# Patient Record
Sex: MALE | Race: WHITE | Employment: OTHER | URBAN - METROPOLITAN AREA
[De-identification: names, ages, dates, MRNs, and addresses within clinical notes are randomized per-mention and may not be internally consistent; named-entity substitution may affect disease eponyms.]

---

## 2017-07-06 ENCOUNTER — HOSPITAL ENCOUNTER (OUTPATIENT)
Dept: PET IMAGING | Age: 67
Discharge: HOME | End: 2017-07-06
Attending: INTERNAL MEDICINE
Payer: MEDICARE

## 2017-07-06 VITALS — BODY MASS INDEX: 33.21 KG/M2 | HEIGHT: 70 IN | WEIGHT: 232 LBS

## 2017-07-06 DIAGNOSIS — R91.1 PULMONARY NODULE, RIGHT: ICD-10-CM

## 2017-07-06 PROCEDURE — 78815 PET IMAGE W/CT SKULL-THIGH: CPT

## 2017-07-06 RX ORDER — SODIUM CHLORIDE 0.9 % (FLUSH) 0.9 %
10 SYRINGE (ML) INJECTION
Status: COMPLETED | OUTPATIENT
Start: 2017-07-06 | End: 2017-07-06

## 2017-07-06 RX ADMIN — Medication 10 ML: at 07:53

## 2017-10-23 ENCOUNTER — OFFICE VISIT (OUTPATIENT)
Dept: CARDIOLOGY CLINIC | Age: 67
End: 2017-10-23

## 2017-10-23 VITALS
DIASTOLIC BLOOD PRESSURE: 60 MMHG | HEART RATE: 79 BPM | SYSTOLIC BLOOD PRESSURE: 130 MMHG | BODY MASS INDEX: 35.65 KG/M2 | HEIGHT: 70 IN | RESPIRATION RATE: 16 BRPM | OXYGEN SATURATION: 95 % | WEIGHT: 249 LBS

## 2017-10-23 DIAGNOSIS — C34.31 MALIGNANT NEOPLASM OF LOWER LOBE OF RIGHT LUNG (HCC): ICD-10-CM

## 2017-10-23 DIAGNOSIS — I25.10 CORONARY ARTERY DISEASE INVOLVING NATIVE CORONARY ARTERY OF NATIVE HEART WITHOUT ANGINA PECTORIS: ICD-10-CM

## 2017-10-23 DIAGNOSIS — I73.9 INTERMITTENT CLAUDICATION (HCC): ICD-10-CM

## 2017-10-23 DIAGNOSIS — J44.9 CHRONIC OBSTRUCTIVE PULMONARY DISEASE, UNSPECIFIED COPD TYPE (HCC): ICD-10-CM

## 2017-10-23 DIAGNOSIS — I27.81 COR PULMONALE (HCC): Primary | ICD-10-CM

## 2017-10-23 NOTE — MR AVS SNAPSHOT
Visit Information Date & Time Provider Department Dept. Phone Encounter #  
 10/23/2017  1:40 PM Nadia Robin MD [facility] Donte Mason [phone] [id number] Upcoming Health Maintenance Date Due Hepatitis C Screening 1950 DTaP/Tdap/Td series (1 - Tdap) 11/20/1969 FOBT Q 1 YEAR AGE 50-75 11/20/1998 ZOSTER VACCINE AGE 60> 9/20/2008 GLAUCOMA SCREENING Q2Y 11/20/2013 Pneumococcal 65+ Low/Medium Risk (1 of 2 - PCV13) 11/20/2013 MEDICARE YEARLY EXAM 11/20/2013 INFLUENZA AGE 9 TO ADULT 8/1/2017 Allergies as of 10/23/2017  Review Complete On: 10/23/2017 By: Claudia Sterling Severity Noted Reaction Type Reactions Other Plant, Animal, Environmental  10/12/2016    Runny Nose Tree pollen, mold, dust  
  
Current Immunizations  Never Reviewed No immunizations on file. Not reviewed this visit Vitals BP Pulse Resp Height(growth percentile) Weight(growth percentile) SpO2  
 130/60 (BP 1 Location: Left arm, BP Patient Position: Sitting) 79 16 5' 10\" (1.778 m) 249 lb (112.9 kg) 95% BMI Smoking Status 35.73 kg/m2 Former Smoker BMI and BSA Data Body Mass Index Body Surface Area 35.73 kg/m 2 2.36 m 2 Preferred Pharmacy Pharmacy Name Phone [address] [facility] [phone] Your Updated Medication List  
  
   
This list is accurate as of: 10/23/17  1:48 PM.  Always use your most recent med list.  
  
  
  
  
 aspirin, buffered 81 mg Tab Take 81 mg by mouth daily. carvedilol 3.125 mg tablet Commonly known as:  Kavon Pintos Take 1 Tab by mouth two (2) times daily (with meals). 90-day supply for ICD9 291.8 Diastolic heart failure CIALIS 5 mg tablet Generic drug:  tadalafil Take 5 mg by mouth as needed. COMBIVENT RESPIMAT  mcg/actuation inhaler Generic drug:  ipratropium-albuterol Take 1 Puff by inhalation every six (6) hours. CRESTOR 20 mg tablet Generic drug:  rosuvastatin Take 20 mg by mouth nightly. fluticasone-salmeterol 250-50 mcg/dose diskus inhaler Commonly known as:  ADVAIR DISKUS Take 1 Puff by inhalation every twelve (12) hours. 90-day supply for ICD9 493.2 Chronic obstructive asthma  
  
 furosemide 40 mg tablet Commonly known as:  LASIX Take 1 Tab by mouth daily. 90-day supply for ICD9 416.8 Other chronic pulmonary heart diseases  
  
 insulin lispro protamine/insulin lispro 100 unit/mL (75-25) injection Commonly known as:  HumaLOG Mix 75-25 Take 25 units twice daily with breakfast and dinner. Please increase as needed  Indications: TYPE 2 DIABETES MELLITUS  
  
 JARDIANCE 10 mg tablet Generic drug:  empagliflozin Take  by mouth daily. metFORMIN 1,000 mg tablet Commonly known as:  GLUCOPHAGE Take 1,000 mg by mouth two (2) times daily (with meals). Indications: TYPE 2 DIABETES MELLITUS STIOLTO RESPIMAT 2.5-2.5 mcg/actuation Mist  
Generic drug:  tiotropium-olodaterol Take  by inhalation. ZyrTEC 10 mg tablet Generic drug:  cetirizine Take 10 mg by mouth daily. Introducing [facility]! Dear Yanick Thakkar: Thank you for requesting a modulR account. Our records indicate that you already have an active modulR account. You can access your account anytime at [email or web address] Did you know that you can access your hospital and ER discharge instructions at any time in modulR? You can also review all of your test results from your hospital stay or ER visit. Additional Information If you have questions, please visit the Frequently Asked Questions section of the modulR website at [email or web address] Remember, modulR is NOT to be used for urgent needs. For medical emergencies, dial 911. Now available from your iPhone and Android! Please provide this summary of care documentation to your next provider. Your primary care clinician is listed as Bin Tellez. If you have any questions after today's visit, please call [phone].

## 2017-10-23 NOTE — PROGRESS NOTES
HISTORY OF PRESENT ILLNESS  Heidi Guerrero is a 74 y.o. male. He has COPD and some right heart dysfunction. He has continued to smoke cigarettes. He also has diabetes. However, eight weeks ago, he was found to have a cancer in his right lower lobe and underwent resection with minimally invasive surgery at [facility] [place]. He has recovered quite quickly. He is now walking two miles per day. He hopes to go up to four miles per day. He has stopped smoking. HPI  Patient Active Problem List   Diagnosis Code    [facility]chronic obstructive pulmonary disease) ([facility] J44.9    Right-sided heart failure syndrome I50.810     Current Outpatient Prescriptions   Medication Sig Dispense Refill    ipratropium-albuterol (COMBIVENT RESPIMAT)  mcg/actuation inhaler Take 1 Puff by inhalation every six (6) hours.  tiotropium-olodaterol (STIOLTO RESPIMAT) 2.5-2.5 mcg/actuation mist Take  by inhalation.  carvedilol (COREG) 3.125 mg tablet Take 1 Tab by mouth two (2) times daily (with meals). 90-day supply for ICD9 465.2 Diastolic heart failure 852 Tab 3    furosemide (LASIX) 40 mg tablet Take 1 Tab by mouth daily. 90-day supply for ICD9 416.8 Other chronic pulmonary heart diseases 90 Tab 3    empagliflozin (JARDIANCE) 10 mg tablet Take  by mouth daily.  insulin lispro protamine/insulin lispro (HUMALOG MIX 75-25) 100 unit/mL (75-25) injection Take 25 units twice daily with breakfast and dinner. Please increase as needed  Indications: TYPE 2 DIABETES MELLITUS (Patient taking differently: 50 Units by SubCUTAneous route two (2) times a day. Take 50 units twice daily  Indications: TYPE 2 DIABETES MELLITUS) 1 Vial 0    Aspirin, Buffered 81 mg tab Take 81 mg by mouth daily.  tadalafil (CIALIS) 5 mg tablet Take 5 mg by mouth as needed.  cetirizine (ZYRTEC) 10 mg tablet Take 10 mg by mouth daily.       metFORMIN (GLUCOPHAGE) 1,000 mg tablet Take 1,000 mg by mouth two (2) times daily (with meals). Indications: TYPE 2 DIABETES MELLITUS      fluticasone-salmeterol (ADVAIR DISKUS) 250-50 mcg/dose diskus inhaler Take 1 Puff by inhalation every twelve (12) hours. 90-day supply for ICD9 493.2 Chronic obstructive asthma 3 Inhaler 3    rosuvastatin (CRESTOR) 20 mg tablet Take 20 mg by mouth nightly. Past Medical History:   Diagnosis Date    Arthritis     CAD (coronary artery disease)     Congestive heart failure (HCC)     Diabetes ([facility]     High cholesterol      Past Surgical History:   Procedure Laterality Date    HX HERNIA REPAIR      Abd       Review of Systems   Respiratory: Positive for shortness of breath. All other systems reviewed and are negative. Visit Vitals    /60 (BP 1 Location: Left arm, BP Patient Position: Sitting)    Pulse 79    Resp 16    Ht 5' 10\" (1.778 m)    Wt 249 lb (112.9 kg)    SpO2 95%    BMI 35.73 kg/m2       Physical Exam   Constitutional: He is oriented to person, place, and time. He appears well-nourished. HENT:   Head: Atraumatic. Eyes: Conjunctivae are normal.   Neck: Neck supple. Cardiovascular: Normal rate, regular rhythm and normal heart sounds. Exam reveals no gallop and no friction rub. No murmur heard. Pulmonary/Chest: He has no wheezes. He has no rales. Abdominal: Bowel sounds are normal.   Musculoskeletal: He exhibits no edema. Neurological: He is oriented to person, place, and time. Skin: Skin is dry. Nursing note and vitals reviewed. ASSESSMENT and PLAN  He has recovered quite quickly from the lung surgery. I congratulated him on stopping smoking. His blood pressure is still normal, but it is slightly higher and this should improve with his exercise. He plans to increase his walking to four miles per day. I will see him back in  one year.

## 2017-11-01 ENCOUNTER — DOCUMENTATION ONLY (OUTPATIENT)
Dept: ONCOLOGY | Age: 67
End: 2017-11-01

## 2017-11-01 NOTE — PROGRESS NOTES
This pt was referred to us for lung cancer? No records   ?  Path / records from [facility] when he needs appt and for what  Need records

## 2017-11-13 RX ORDER — FUROSEMIDE 40 MG/1
40 TABLET ORAL DAILY
Qty: 90 TAB | Refills: 3 | Status: SHIPPED | OUTPATIENT
Start: 2017-11-13

## 2017-12-15 ENCOUNTER — OFFICE VISIT (OUTPATIENT)
Dept: ENDOCRINOLOGY | Age: 67
End: 2017-12-15

## 2017-12-15 VITALS
DIASTOLIC BLOOD PRESSURE: 66 MMHG | HEIGHT: 70 IN | WEIGHT: 251 LBS | SYSTOLIC BLOOD PRESSURE: 125 MMHG | BODY MASS INDEX: 35.93 KG/M2 | HEART RATE: 66 BPM

## 2017-12-15 NOTE — PROGRESS NOTES
This 70 yo WM with Type 2 diabetes comes for diabetes care and self-management training. Diagnosed approximately 20 years ago. Strong family history. Currently treated with metformin & combo insulin. A1c elevated. Patient accompanied by . Reports decent BG control prior to surgical intervention for lung CA in August 2017. Past Medical History:   Diagnosis Date    Arthritis     CAD (coronary artery disease)     Congestive heart failure (HCC)     Diabetes (HCC)     High cholesterol        Current Outpatient Prescriptions   Medication Sig    furosemide (LASIX) 40 mg tablet Take 1 Tab by mouth daily. 90-day supply for ICD9 416.8 Other chronic pulmonary heart diseases    ipratropium-albuterol (COMBIVENT RESPIMAT)  mcg/actuation inhaler Take 1 Puff by inhalation every six (6) hours.  tiotropium-olodaterol (STIOLTO RESPIMAT) 2.5-2.5 mcg/actuation mist Take  by inhalation.  carvedilol (COREG) 3.125 mg tablet Take 1 Tab by mouth two (2) times daily (with meals). 90-day supply for ICD9 162.4 Diastolic heart failure    empagliflozin (JARDIANCE) 10 mg tablet Take 10 mg by mouth daily.  insulin lispro protamine/insulin lispro (HUMALOG MIX 75-25) 100 unit/mL (75-25) injection Take 25 units twice daily with breakfast and dinner. Please increase as needed  Indications: TYPE 2 DIABETES MELLITUS (Patient taking differently: 55 Units by SubCUTAneous route two (2) times a day. Take 50 units twice daily  Indications: type 2 diabetes mellitus)    Aspirin, Buffered 81 mg tab Take 81 mg by mouth daily.  tadalafil (CIALIS) 5 mg tablet Take 5 mg by mouth as needed.  cetirizine (ZYRTEC) 10 mg tablet Take 10 mg by mouth daily.  metFORMIN (GLUCOPHAGE) 1,000 mg tablet Take 1,000 mg by mouth two (2) times daily (with meals). Indications: TYPE 2 DIABETES MELLITUS    fluticasone-salmeterol (ADVAIR DISKUS) 250-50 mcg/dose diskus inhaler Take 1 Puff by inhalation every twelve (12) hours.  90-day supply for ICD9 493.2 Chronic obstructive asthma    rosuvastatin (CRESTOR) 20 mg tablet Take 20 mg by mouth nightly. No current facility-administered medications for this visit. Medication compliance per Valentino Santana LPN    Nursing Assessment:    Subjective:   Diabetes Self-care Practices  Healthy Eating-Consumes moderate carbohydrate meals three times on most days. Wide variation of CHO load    (B) (2) toast with coffee   (L) Homemade soup w/wo crackers; two sandwiches with chips. Drinks iced tea or water   (D) Hotdog or hamburger on bread w chips; protein with red potato and green beans   (BT) Pepperoni or cheese with crackers; corn chips  Being Active-Yard work during day. Walks 2 miles 3 times weekly now  Monitoring-Is testing fasting blood glucoses; did not bring meter   (B) 140-170  Taking Medications-Is taking prescribed diabetes medications    Objective:  Alert, oriented and in no acute distress     Visit Vitals    /66 (BP 1 Location: Right arm, BP Patient Position: Sitting)    Pulse 66    Ht 5' 10\" (1.778 m)    Wt 251 lb (113.9 kg)    BMI 36.01 kg/m2        Lab Results   Component Value Date    HBA1C 8.9 (H) 05/21/2015    CHOL 71 05/22/2015    LDLC 25.2 05/22/2015    GFRAA >60 05/24/2015    GFRNA >60 05/24/2015    TSH 11.50 (H) 05/21/2015       Diabetes Self-care Practices  Problemsolving-Concerned  Healthy Coping-Is not anxious or depressed  Reducing Risks-Annual diabetic foot exam completed today. On ASA and BP medications.  Not on statin therapy due to leg cramps  Barriers to diabetes self-care-Educational gaps related to carbohydrate    Nursing Diagnosis: Readiness for Enhanced Nutrition    Nursing Interventions:  Determined food intake, eating habits, food likes and dislikes  Identified eating behaviors to be changed  Discussed a meal plan that uses Health Plate principles  Instructed on how to read food labels    Evaluation: Patient is agreeable to CHO controlled meals    Plan:  1. Stop Jardiance per Melva Jackman MD   2. Continue metformin and combo insulin per Melva Jackman MD  3. BGM fasting, pre-dinner and bedtime for two weeks; call with results  4.  RTC one month    Harvey Santos DNP, RN, ACNS-BC, BC-ADM, CDE  Adult Health Clinical Nurse Specialist-Diabetes & Endocrine

## 2017-12-15 NOTE — PROGRESS NOTES
Chief Complaint   Patient presents with    New Patient    Diabetes     Type II diabetes. Last A1C - 8.5% - 10/24/17 . Patient is testing BS onde daily - fasting     Diabetic Foot Exam     Foot exam is due     Other     Patient states that he has had an eye exam within the past year - Dr. Stan Ayala - Next to [facility] on [address] is due        HPI  This is a 20-year-old white male with a history of type 2 diabetes mellitus ×20 years self-referred for evaluation and management. Patient has had intermittently well-controlled diabetes mellitus. He had an A1c of 8.9% in 2015. His A1c in May 2017 was 7.7%. His A1c in October of this year was 8.5%. He has been on a number of medications for his diabetes. Currently he is on Humalog 75/25 insulin 55 units twice daily, jardiance 10 mg daily, and metformin 1000 mg twice daily. His past medical history is notable for a right lower lobe lung lesion which was malignant and was resected with good [place] [facility] [place] of this [place]. He is apparently felt to be cured of the malignancy. Following the surgery, he quit smoking and is back to walking 2 miles a day. He says he checks his blood sugar in the morning only in those blood sugars range between 145 and 170. If he has breakfast it is 2 pieces of toast but often he just goes for a walk and/or does yard work. Lunch can be a soup with lots of vegetables including potatoes or can be 2 sandwiches with potato chips and unsweetened tea. Dinner can be 2 hotdogs with the bread and chips or hamburger with potatoes and green beans. They apparently eat a lot of non-starchy vegetables. His girlfriend who was with him today is a vegetarian and so she cooks a lot of the vegetables but he is unwilling to go vegetarian. If he snacks at night he can be on pepperoni or potato chips or cheese and crackers. If he has the crackers eats about 20 of them. He complains of nocturia ×2-3.     Laboratory tests obtained recently include a creatinine of 0.9, GFR of 87, calcium 9.6, albumin 4.6, AST 17, ALT 36.  We do not have his lipid profile but he says his total cholesterol is 125. ROS  He denies chest pain or shortness of breath. He says is able to walk 2 miles without shortness of breath. He also denies constipation or diarrhea. He also denies paresthesias.   Family History   Problem Relation Age of Onset    Diabetes Mother     Diabetes Father     Diabetes Brother     Diabetes Maternal Grandmother         Social History     Social History    Marital status:      Spouse name: N/A    Number of children: N/A    Years of education: N/A     Social History Main Topics    Smoking status: Former Smoker     Packs/day: 0.50     Types: Cigarettes    Smokeless tobacco: Never Used    Alcohol use No    Drug use: No    Sexual activity: Not Asked     Other Topics Concern    None     Social History Narrative        Vitals:    12/15/17 1321   BP: 125/66   Pulse: 66   Weight: 251 lb (113.9 kg)   Height: 5' 10\" (1.778 m)   PainSc:   0 - No pain        Physical Examination: General appearance - alert, well appearing, and in no distress  Mental status - alert, oriented to person, place, and time  Neck - supple, no significant adenopathy, thyroid exam: thyroid is normal in size without nodules or tenderness  Chest - clear to auscultation, no wheezes, rales or rhonchi, symmetric air entry  Heart - normal rate, regular rhythm, normal S1, S2, no murmurs, rubs, clicks or gallops  Abdomen - soft, nontender, nondistended, no masses or organomegaly  Extremities - peripheral pulses normal, no pedal edema, no clubbing or cyanosis    Diabetic foot exam:     Left: Reflexes 4+     Vibratory sensation diminished    Filament test normal sensation with micro filament   Pulse DP: 2+ (normal)   Pulse PT: 2+ (normal)   Deformities: None  Right: Reflexes 4+   Vibratory sensation diminished   Filament test normal sensation with micro filament   Pulse DP: 2+ (normal)   Pulse PT: 2+ (normal)   Deformities: None      ASSESSMENT & PLAN  Josue Walker has type 2 diabetes mellitus on 55 units of 75/25 insulin twice daily plus jardiance plus metformin. He has gained 20 pounds since his lung surgery and certainly this is contributing to his higher blood sugars. His partner tells us that she was impressed with how well his blood sugar was controlled in the hospital compared to now. However he was in a controlled environment of eating compared to his current situation which is fairly high carb. He is beginning to get back to walking which is certainly likely to help. Plan:  1. We have continued the insulin as noted above for now  2. We continued the Metformin 1000 mg twice daily  3. We discontinued the jardiance  4. We have asked him to monitor his blood sugars before breakfast, before dinner, and at bedtime. I hope is that he will see the impact of his high carb dinners on his blood sugars and begin to modify the carbohydrate intake as opposed to increasing the evening dose of insulin. He will call me with the results of his blood sugars. 5.  He will continue to walk as he is able. 6.  We will see him back in 1 month.

## 2017-12-16 LAB
ALBUMIN/CREAT UR: 49.2 MG/G CREAT (ref 0–30)
CREAT UR-MCNC: 42.1 MG/DL
MICROALBUMIN UR-MCNC: 20.7 UG/ML

## 2018-01-29 ENCOUNTER — OFFICE VISIT (OUTPATIENT)
Dept: ENDOCRINOLOGY | Age: 68
End: 2018-01-29

## 2018-01-29 VITALS
HEIGHT: 70 IN | WEIGHT: 255 LBS | DIASTOLIC BLOOD PRESSURE: 72 MMHG | BODY MASS INDEX: 36.51 KG/M2 | SYSTOLIC BLOOD PRESSURE: 131 MMHG | HEART RATE: 64 BPM

## 2018-01-29 LAB — HBA1C MFR BLD HPLC: 8.1 %

## 2018-01-29 RX ORDER — INSULIN LISPRO 100 [IU]/ML
55 INJECTION, SUSPENSION SUBCUTANEOUS 2 TIMES DAILY
Qty: 15 PEN | Refills: 3
Start: 2018-01-29 | End: 2018-12-03 | Stop reason: SDUPTHER

## 2018-01-29 NOTE — PROGRESS NOTES
The patient was seen by myself and Everardo Zuñiga DNP, CNS    This gentleman returns for follow-up of his type 2 diabetes mellitus and history of a right lower lobe lung mass status post resection. He is currently on 75/25 insulin 55 units twice daily, jardiance, and metformin 1000 mg twice daily. When we saw him for the first time, I suggested that he stop the jardiance but he has continued until now. His A1c had been 8.5%. His A1c today is 8.1%. He has been checking his blood sugars 4 times a day as we have asked. His fasting blood sugars range between 85 and 140. Prelunch . Predinner 150-180. Bedtime 150-200. He had wanted to get back to exercising but he is not been doing that so much since the weather has gotten worse. Breakfast is 2 pieces of toast with butter. Lunch last yesterday was 2 hotdogs and some cottage cheese. Dinner last night with 2 slices of pizza. His blood sugar in the morning was 85 but his blood sugar at bedtime was 230. He is frustrated with the blood sugars and surprised that the A1c today of 8.1%. Examination  Blood pressure 131/72  Pulse 64  Weight 255    Impression type 2 diabetes mellitus with some mild improvement in blood sugar. Plan: We have encouraged him to get back to walking and exercising on a bicycle which he would like to do. He and his friend are going to [facility] this spring and so we would anticipate that after that trip with walking and greater attention to his diet, his next A1c can be less than 8%. We will see him back in 4 months. We spent more than 25 mintutes face to face, more than 50% was in counseling regarding diet, exercise and carbohydrate intake.

## 2018-05-07 ENCOUNTER — OFFICE VISIT (OUTPATIENT)
Dept: ENDOCRINOLOGY | Age: 68
End: 2018-05-07

## 2018-05-07 VITALS
BODY MASS INDEX: 36.65 KG/M2 | SYSTOLIC BLOOD PRESSURE: 114 MMHG | DIASTOLIC BLOOD PRESSURE: 71 MMHG | HEIGHT: 70 IN | WEIGHT: 256 LBS | HEART RATE: 74 BPM

## 2018-05-07 PROBLEM — E11.21 TYPE 2 DIABETES WITH NEPHROPATHY (HCC): Status: ACTIVE | Noted: 2018-05-07

## 2018-05-07 PROBLEM — E66.01 SEVERE OBESITY (BMI 35.0-39.9) WITH COMORBIDITY (HCC): Status: ACTIVE | Noted: 2018-05-07

## 2018-05-07 LAB — HBA1C MFR BLD HPLC: 8.7 %

## 2018-05-07 RX ORDER — PEN NEEDLE, DIABETIC 32 GX 1/4"
NEEDLE, DISPOSABLE MISCELLANEOUS
COMMUNITY
Start: 2018-03-15 | End: 2019-12-04 | Stop reason: SDUPTHER

## 2018-05-07 RX ORDER — BLOOD-GLUCOSE METER
KIT MISCELLANEOUS
COMMUNITY
Start: 2018-03-01

## 2018-05-07 NOTE — PROGRESS NOTES
Darlin Wise is a 69 y.o. male    Chief Complaint   Patient presents with    Follow-up    Diabetes    Other     Lipid panel due     1. Have you been to the ER, urgent care clinic since your last visit? Hospitalized since your last visit? No    2. Have you seen or consulted any other health care providers outside of the [facility] since your last visit? Include any pap smears or colon screening.  No

## 2018-05-08 LAB
ALBUMIN SERPL-MCNC: 4.1 G/DL (ref 3.6–4.8)
ALBUMIN/GLOB SERPL: 1.1 {RATIO} (ref 1.2–2.2)
ALP SERPL-CCNC: 106 IU/L (ref 39–117)
ALT SERPL-CCNC: 27 IU/L (ref 0–44)
AST SERPL-CCNC: 23 IU/L (ref 0–40)
BILIRUB SERPL-MCNC: 0.4 MG/DL (ref 0–1.2)
BUN SERPL-MCNC: 8 MG/DL (ref 8–27)
BUN/CREAT SERPL: 9 (ref 10–24)
CALCIUM SERPL-MCNC: 9.3 MG/DL (ref 8.6–10.2)
CHLORIDE SERPL-SCNC: 94 MMOL/L (ref 96–106)
CO2 SERPL-SCNC: 26 MMOL/L (ref 18–29)
CREAT SERPL-MCNC: 0.92 MG/DL (ref 0.76–1.27)
GFR SERPLBLD CREATININE-BSD FMLA CKD-EPI: 85 ML/MIN/1.73
GFR SERPLBLD CREATININE-BSD FMLA CKD-EPI: 98 ML/MIN/1.73
GLOBULIN SER CALC-MCNC: 3.6 G/DL (ref 1.5–4.5)
GLUCOSE SERPL-MCNC: 174 MG/DL (ref 65–99)
POTASSIUM SERPL-SCNC: 5 MMOL/L (ref 3.5–5.2)
PROT SERPL-MCNC: 7.7 G/DL (ref 6–8.5)
SODIUM SERPL-SCNC: 137 MMOL/L (ref 134–144)

## 2018-06-05 RX ORDER — INSULIN LISPRO 100 [IU]/ML
45 INJECTION, SUSPENSION SUBCUTANEOUS 3 TIMES DAILY
Qty: 40 PEN | Refills: 3 | Status: SHIPPED | OUTPATIENT
Start: 2018-06-05 | End: 2019-06-02 | Stop reason: SDUPTHER

## 2018-08-20 ENCOUNTER — OFFICE VISIT (OUTPATIENT)
Dept: ENDOCRINOLOGY | Age: 68
End: 2018-08-20

## 2018-08-20 VITALS
HEART RATE: 65 BPM | WEIGHT: 263.8 LBS | HEIGHT: 70 IN | BODY MASS INDEX: 37.77 KG/M2 | DIASTOLIC BLOOD PRESSURE: 75 MMHG | SYSTOLIC BLOOD PRESSURE: 124 MMHG

## 2018-08-20 DIAGNOSIS — E11.21 TYPE 2 DIABETES WITH NEPHROPATHY (HCC): Primary | ICD-10-CM

## 2018-08-20 LAB — HBA1C MFR BLD HPLC: 8.8 %

## 2018-08-20 RX ORDER — TADALAFIL 20 MG/1
20 TABLET, FILM COATED ORAL AS NEEDED
COMMUNITY
Start: 2018-07-19

## 2018-08-20 NOTE — PROGRESS NOTES
The patient was seen by myself and Neo Zamudio DNP, CNS    Mr. Armando Hartley returns for follow-up of his type 2 diabetes mellitus and morbid obesity. He is currently on 75/25 insulin 60 units 3 times daily. We recently increased it from 50-60 units. He brings in his blood sugar log and he actually has blood sugars now that are in the  range fairly consistently. He has been frustrated that his diabetes is not better controlled. He says he is watching what he eats. He admits that he is not very active. His A1c today is 8.8% which is unchanged from 3 months ago. Examination  Blood pressure 124/75  Pulse 65  Weight 263      impression type 2 diabetes mellitus with persistently elevated hemoglobin A1c  Plan: We continue the insulin at 60 units 3 times daily. We will see him back in 3 months. We did discuss at length other options including newer GLP's. He does admit that the Byetta which he took many years ago resulted in weight loss but did not get his A1c to goal.  Ozempic is a possibility for him    We spent more than 25 mintutes face to face, more than 50% was in counseling regarding diet, exercise and carbohydrate intake.

## 2018-11-08 RX ORDER — FUROSEMIDE 40 MG/1
TABLET ORAL
Qty: 90 TAB | Refills: 3 | OUTPATIENT
Start: 2018-11-08

## 2018-11-08 NOTE — TELEPHONE ENCOUNTER
Requested Prescriptions     Refused Prescriptions Disp Refills    furosemide (LASIX) 40 mg tablet [Pharmacy Med Name: FUROSEMIDE TABS 40MG] 90 Tab 3     Sig: TAKE 1 TABLET DAILY FOR CHRONIC PULMONARY HEART DISEASES     Refused By: Anna Sage     Reason for Refusal: Appt required, please call patient           Future Appointments   Date Time Provider Suze Frye   12/3/2018 10:50 AM Vargas Arce MD [facility]

## 2018-12-03 ENCOUNTER — OFFICE VISIT (OUTPATIENT)
Dept: ENDOCRINOLOGY | Age: 68
End: 2018-12-03

## 2018-12-03 VITALS
BODY MASS INDEX: 38.51 KG/M2 | DIASTOLIC BLOOD PRESSURE: 74 MMHG | OXYGEN SATURATION: 95 % | RESPIRATION RATE: 16 BRPM | HEART RATE: 65 BPM | SYSTOLIC BLOOD PRESSURE: 140 MMHG | TEMPERATURE: 96.6 F | HEIGHT: 70 IN | WEIGHT: 269 LBS

## 2018-12-03 LAB — HBA1C MFR BLD HPLC: 8.3 %

## 2018-12-03 NOTE — PROGRESS NOTES
Mr. Ronna Peabody returns for follow-up of his type 2 diabetes mellitus and morbid obesity on metformin +70/30 insulin 60 units 3 times daily. When we saw him last his A1c was 8.8%. His A1c today is 8.3%. He is frustrated that his blood sugars are not better. He is also frustrated that his weight is increased. He is checking his blood sugars 4 times a day and records them. He has a lot of blood sugars that are in the outstanding range but occasionally blood sugars as high as 200 which are consistent with the A1c. He does note that he is physically active, his blood sugar can get down to the 70 or even lower range. If he is not active his blood sugars are in the 140-200 range. He does tell us that in the past when he is physically active he is also found that his blood sugars can be controlled. Examination Blood pressure 140/74 Pulse 65 Weight 269 Diabetic foot exam:  
 
Left Foot: 
 Visual Exam: normal  
 Pulse DP: 2+ (normal) Filament test: normal sensation Vibratory sensation: diminished Right Foot: 
 Visual Exam: normal  
 Pulse DP: 2+ (normal) Filament test: normal sensation Vibratory sensation: diminished Impression type 2 diabetes mellitus and morbid obesity currently on 180 units of insulin daily. Plan: We discussed the possibility of adding a GLP. He has been on Byetta in the past and actually says he will lost weight on that. However he would prefer to not add another medication but try to add exercise. His partner is encouraging him to go to the gym and he would like to try that. He does share with us that there are a few options in [place], [place] but we were able to identify 2. We will see him back in 3 months. We spent more than 25 mintutes face to face, more than 50% was in counseling regarding diet, exercise and carbohydrate intake.

## 2018-12-03 NOTE — PROGRESS NOTES
Chief Complaint Patient presents with  Diabetes 3 mo f/u, checks BG at least 3 times daily, not running as well as pt would like Health Maintenance Due Topic Date Due  
 Hepatitis C Screening  1950  DTaP/Tdap/Td series (1 - Tdap) 11/20/1969  Shingrix Vaccine Age 50> (1 of 2) 11/20/1998  
 FOBT Q 1 YEAR AGE 50-75  11/20/1998  Pneumococcal 65+ Low/Medium Risk (1 of 2 - PCV13) 11/20/2013  LIPID PANEL Q1  05/22/2016  MEDICARE YEARLY EXAM  03/14/2018  Influenza Age 5 to Adult  08/01/2018  
 FOOT EXAM Q1  12/15/2018  MICROALBUMIN Q1  12/15/2018  
will schedule eye exam at the first of yr with Dr. Israel Bishop Coordination of Care Questions 1. Have you been to the ER, urgent care clinic since your last visit? No  
    Hospitalized since your last visit? No 
 
2. Have you seen or consulted any other health care providers outside of the [address] [place] [address] since your last visit? Include any pap smears or colon screening.  No

## 2018-12-04 LAB
ALBUMIN/CREAT UR: 114.8 MG/G CREAT (ref 0–30)
CHOLEST SERPL-MCNC: 234 MG/DL (ref 100–199)
CREAT UR-MCNC: 16.9 MG/DL
HDLC SERPL-MCNC: 36 MG/DL
INTERPRETATION, 910389: NORMAL
LDLC SERPL CALC-MCNC: 149 MG/DL (ref 0–99)
Lab: NORMAL
MICROALBUMIN UR-MCNC: 19.4 UG/ML
TRIGL SERPL-MCNC: 247 MG/DL (ref 0–149)
VLDLC SERPL CALC-MCNC: 49 MG/DL (ref 5–40)

## 2018-12-11 RX ORDER — METFORMIN HYDROCHLORIDE 1000 MG/1
1000 TABLET ORAL 2 TIMES DAILY WITH MEALS
Qty: 180 TAB | Refills: 3 | Status: SHIPPED | OUTPATIENT
Start: 2018-12-11 | End: 2019-12-06 | Stop reason: SDUPTHER

## 2019-03-04 ENCOUNTER — OFFICE VISIT (OUTPATIENT)
Dept: ENDOCRINOLOGY | Age: 69
End: 2019-03-04

## 2019-03-04 VITALS
HEIGHT: 70 IN | HEART RATE: 61 BPM | DIASTOLIC BLOOD PRESSURE: 75 MMHG | BODY MASS INDEX: 39.94 KG/M2 | SYSTOLIC BLOOD PRESSURE: 151 MMHG | WEIGHT: 279 LBS

## 2019-03-04 LAB — HBA1C MFR BLD HPLC: 8.3 %

## 2019-03-04 NOTE — PROGRESS NOTES
Mr. Afshan Rahman returns for follow-up of his type 2 diabetes mellitus with fair glucose control. He is currently taking 75/25 insulin 60 units 3 times daily and his A1c today is 8.3%. This is identical to his A1c in December. He was committed to walking and he did observe in January that when he was walking on a regular basis he had fasting blood sugars of 100-150. However he is fallen off of that and his blood sugars have crept back up into the 200s. He was recently in [facility]  and did fairly well while he was in [facility] but overall his blood sugars have not done well. Most recently his fasting blood sugars have ranged in the 200-250 range but occasionally as low as 120. His diet is unchanged. He does not eat breakfast.  Lunch is a sandwich with cottage cheese and chips. Debra Riff is usually red meat with potatoes and vegetables. Last night he had 2 slices of pizza. Examination  Blood pressure 151/75  Pulse 61  Weight 279 (he is gained 10 pounds since December    Impression type 2 diabetes mellitus with poor blood sugar control on 75/25 insulin 3 times daily and full dose metformin  Plan: He has been on Byetta in the past and so we have elected to try Bydureon 2 mg weekly. He will give his first dose tomorrow. I advised him that as soon as he starts seeing improvements in his blood sugars to go from 3 times daily to 2 times daily on the insulin and we will hopefully taper further. We will see him back in 1 month. We spent more than 25 mintutes face to face, more than 50% was in counseling regarding diet, exercise and carbohydrate intake.

## 2019-03-06 ENCOUNTER — TELEPHONE (OUTPATIENT)
Dept: ENDOCRINOLOGY | Age: 69
End: 2019-03-06

## 2019-03-06 NOTE — TELEPHONE ENCOUNTER
Claudette Couch Dr. Reche Jenkins sent us a fax stating that the Bydureon Bcise vials that you prescribed for Mr. Alexia Awan are discontinued. They were wondering if you would like to prescribe the pen form instead and if so to send a prescription with the update?

## 2019-04-16 NOTE — PROGRESS NOTES
The patient was seen by myself and Adrian Jurado DNP, CNS    This gentleman returns for follow-up of his type 2 diabetes mellitus, morbid obesity, and a diagnosis of lung cancer status post resection with presumed good margins. Prior to the diagnosis of lung cancer in the surgery, his blood sugars were well-controlled on 75/25 insulin and metformin. Since the surgery, he is quit smoking, gained some weight, and despite increasing doses of insulin his blood sugars have not been well controlled. His last A1c had been 8.4%. His most recent A1c is 8.7%. He recently went with his partner to [facility] and despite all the walking he and she experienced persistently elevated blood sugars. He says his diet was good. He brings in his blood sugar log. He checks blood sugars 3-4 times a day consistently. If he eats absolutely nothing and take 60 units of 75/25 insulin twice daily his blood sugar ranges between 100-150. If he eats anything, his blood sugars greater than 200. This is a great frustration for him. Examination  Blood pressure 114/71  Pulse 74  Weight 256    Impression  1. Type 2 diabetes mellitus with poor blood sugar control 120 units of insulin daily plus metformin  2. Lung cancer. He is scheduled for a PET/CT later this month  3.  Obesity    Plan:  1. It seems most appropriate to increase his insulin. He has a lot of Humalog 75/25 insulin left and although going to U-500 insulin may be the answer, we have elected to split the Humalog 75/25-3 times daily. He will start with 40 units 3 times daily and we will work up from there. If this does not work or if we begin to find a threshold insulin dose, we can transfer transition him to U-500 if needed. I suspect we are going to get what we need to go with this strategy. It is clear that he is very concerned about something going on inside of him that is causing his blood sugar to be elevated despite all of this insulin.   The PET/CT will certainly be helpful in that regard. We spent more than 25 mintutes face to face, more than 50% was in counseling regarding diet, exercise and carbohydrate intake. O-L Flap Text: The defect edges were debeveled with a #15 scalpel blade.  Given the location of the defect, shape of the defect and the proximity to free margins an O-L flap was deemed most appropriate.  Using a sterile surgical marker, an appropriate advancement flap was drawn incorporating the defect and placing the expected incisions within the relaxed skin tension lines where possible.    The area thus outlined was incised deep to adipose tissue with a #15 scalpel blade.  The skin margins were undermined to an appropriate distance in all directions utilizing iris scissors.

## 2019-04-22 ENCOUNTER — OFFICE VISIT (OUTPATIENT)
Dept: ENDOCRINOLOGY | Age: 69
End: 2019-04-22

## 2019-04-22 VITALS
DIASTOLIC BLOOD PRESSURE: 78 MMHG | SYSTOLIC BLOOD PRESSURE: 137 MMHG | HEIGHT: 70 IN | HEART RATE: 75 BPM | BODY MASS INDEX: 40.66 KG/M2 | WEIGHT: 284 LBS

## 2019-04-22 NOTE — PROGRESS NOTES
Mr. Bella Dewitt returns for follow-up of his type 2 diabetes mellitus. He has been on 75/25 insulin 60 units 3 times daily and full dose metformin. When I saw him last, I added Bydureon 2 mg weekly. He is been taking that for the last several weeks and his blood sugars are really beginning to smooth out nicely. He is now seeing blood sugars ranging between 80 and 150 almost every day and throughout the day. He has not changed his diet very much. His long-term goal is to lose weight. He recently underwent an echo a biopsy of a lung mass which was found to be scar tissue. He still has some pain from that. His diet is unchanged. He is eating lots of salads and lots of meat and non-starchy vegetables. He is also going to the gym in the morning and walks for 45 minutes 5 days a week. Examination  Blood pressure 137/78  Pulse 75  Weight 284    Impression type 2 diabetes mellitus with improving glucose control since adding Bydureon 2 mg weekly. Plan: We have decreased the 75/25 insulin to 60 units twice daily and continue the Bydureon and metformin. I have asked him to call me in 2-3 weeks and I anticipate decreasing the insulin further. His goal although it may be a stretch goal is to get down to 220 pounds. We will see him back in 3 months and repeat his A1c at that time. We spent more than 25 mintutes face to face, more than 50% was in counseling regarding diet, exercise and carbohydrate intake.

## 2019-07-22 ENCOUNTER — OFFICE VISIT (OUTPATIENT)
Dept: ENDOCRINOLOGY | Age: 69
End: 2019-07-22

## 2019-07-22 VITALS
HEIGHT: 70 IN | WEIGHT: 275 LBS | SYSTOLIC BLOOD PRESSURE: 129 MMHG | HEART RATE: 66 BPM | DIASTOLIC BLOOD PRESSURE: 67 MMHG | BODY MASS INDEX: 39.37 KG/M2

## 2019-07-22 LAB — HBA1C MFR BLD HPLC: 8.2 %

## 2019-07-22 NOTE — PROGRESS NOTES
Mr. Flakito Luz returns for follow-up of his type 2 diabetes mellitus. He has been on 75/25 insulin 60 units 3 times daily and full dose metformin. When I saw him last, I added Bydureon 2 mg weekly. He is been taking that for the last several weeks and his blood sugars are really beginning to smooth out nicely. He is now seeing blood sugars ranging between 80 and 150 almost every day and throughout the day. He has not changed his diet very much. His long-term goal is to lose weight. Since we saw him last, his A1c is 8.2% and he is lost a few pounds but he is frustrated that his blood sugars are not as well-controlled as they were when we initially started the Bydureon. Currently he takes Bydureon 2 mg weekly, 75/25 insulin 60 units twice daily instead of 3 times daily, and full dose metformin. His blood sugars in the morning are generally 1 8220. He goes to the gym every day and walks for 60 minutes and his blood sugar, if he checks, is down to the 100-1 20 range with the exercise. We do not have any readings the rest of the day so it is likely that his blood sugars climbing through the rest of the day but we do not have evidence of that yet. He does not eat breakfast.  Lunch is a sandwich. [place] is a meat starch and a non-starchy vegetable. Last night he had to corn on the cob, tomatoes and a [employer]. Examination  Blood pressure 129/67  Pulse 66  Weight 275 which is down 9 pounds. Impression type 2 diabetes mellitus with a modest improvement in glucose and some weight loss with Bydureon plus metformin plus insulin. Plan: We have asked him to send us blood sugars throughout the day so that we can make an adjustment in the insulin. Likely the issue is his either lunch meal or dinnertime meal driving his blood sugar and causing the fasting hyperglycemia. We will make adjustments as needed and we will see him back in 3 months or sooner as needed.     We spent more than 25 mintutes face to face, more than 50% was in counseling regarding diet, exercise and carbohydrate intake.

## 2019-08-01 ENCOUNTER — TELEPHONE (OUTPATIENT)
Dept: ENDOCRINOLOGY | Age: 69
End: 2019-08-01

## 2019-10-23 ENCOUNTER — OFFICE VISIT (OUTPATIENT)
Dept: ENDOCRINOLOGY | Age: 69
End: 2019-10-23

## 2019-10-23 VITALS
DIASTOLIC BLOOD PRESSURE: 73 MMHG | BODY MASS INDEX: 38.22 KG/M2 | WEIGHT: 267 LBS | HEIGHT: 70 IN | HEART RATE: 70 BPM | SYSTOLIC BLOOD PRESSURE: 131 MMHG

## 2019-10-23 LAB — HBA1C MFR BLD HPLC: 8.8 %

## 2019-10-23 NOTE — PROGRESS NOTES
Mr. Ingris Tellez returns for follow-up of his type 2 diabetes mellitus. Keerthi Parker has been on 75/25 insulin 60 units 2 times daily and full dose metformin.  When I saw him last, I added Bydureon 2 mg weekly. Keerthi Parker is been taking that for the last several weeks and his blood sugars are really beginning to smooth out nicely. Keerthi Parker was seeing blood sugars ranging between 80 and 150 almost every day and throughout the day. Keerthi Parker has not changed his diet very much.  His long-term goal is to lose weight. Since I saw him last, his fasting blood sugars have increased and he is seeing lots of 140s and 150s but also occasionally 1 . He is taking his first dose of insulin at around 10:00 in the morning and a second dose of insulin at 10:00 at night which is 3 hours after dinner. He is going to the gym for 1-1/2 hours almost every day. He has lost weight from 284 pounds down to 267 pounds. Breakfast can be a sandwich or a hot dog and chips or can be eggs and toast.  The evening meal can be a hamburger daly or pork chops potatoes and green beans. Occasionally he will have beef and noodles or pasta. He snacks on crackers or popcorn at night. He comes in today with his significant other who is very concerned about his blood sugars and his weight. Examination  Blood pressure 131/73  Pulse 70  Weight 267    Impression type 2 diabetes mellitus with an A1c of 8.8% which is increased from his recent previous value. Plan: I have asked him to take his evening dose of insulin before dinner and have also asked him to check a blood sugar at bedtime. My suspicion is that if we can get his bedtime blood sugars down his morning readings will be better and we will be able to begin to taper his insulin as he continues reducing his carbohydrates and continues exercising. His goal is to lose another 20 pounds before he and his significant other go to [facility].     We spent more than 25 mintutes face to face, more than 50% was in counseling regarding diet, exercise and carbohydrate intake.

## 2019-12-04 RX ORDER — PEN NEEDLE, DIABETIC 32 GX 1/4"
NEEDLE, DISPOSABLE MISCELLANEOUS
Qty: 300 PEN NEEDLE | Refills: 3 | Status: SHIPPED | OUTPATIENT
Start: 2019-12-04 | End: 2020-11-30

## 2019-12-04 NOTE — TELEPHONE ENCOUNTER
Requested Prescriptions     Pending Prescriptions Disp Refills    NOVOFINE 32 32 gauge x 1/4\" ndle 300 Pen Needle 3

## 2020-01-27 RX ORDER — EXENATIDE 2 MG/.85ML
INJECTION, SUSPENSION, EXTENDED RELEASE SUBCUTANEOUS
Qty: 10.2 ML | Refills: 4 | Status: SHIPPED | OUTPATIENT
Start: 2020-01-27 | End: 2021-03-22

## 2020-02-19 ENCOUNTER — OFFICE VISIT (OUTPATIENT)
Dept: ENDOCRINOLOGY | Age: 70
End: 2020-02-19

## 2020-02-19 VITALS
HEART RATE: 68 BPM | SYSTOLIC BLOOD PRESSURE: 126 MMHG | BODY MASS INDEX: 37.5 KG/M2 | DIASTOLIC BLOOD PRESSURE: 81 MMHG | HEIGHT: 70 IN | WEIGHT: 261.9 LBS

## 2020-02-19 LAB — HBA1C MFR BLD HPLC: 9.8 %

## 2020-02-19 NOTE — PROGRESS NOTES
Mr. Tawana Schrader returns for follow-up of his type 2 diabetes mellitus. [facility] has been on 75/25 insulin 60 units 2 times daily and full dose metformin.  When I saw him last, I added Bydureon 2 mg weekly. [facility] is been taking that for the last several weeks and his blood sugars had begun  to smooth out nicely. [facility] was seeing blood sugars ranging between 80 and 150 almost every day and throughout the day. [facility] has not changed his diet very much.  His long-term goal is to lose weight. Since I saw him last, he was diagnosed with adenocarcinoma of the lung and is scheduled to have a PET scan in 2 weeks. His hemoglobin A1c is increased from 8.8% to 9.8%. He is actually lost 15 pounds with the Bydureon. So his current regimen is Bydureon 2 mg weekly, 75/2560 units twice daily. He is walking on a treadmill 4 miles a day. His blood sugars in the morning are usually 200-2 50. He will occasionally see a blood sugar of 100. He does not eat breakfast.  Lunch is a sandwich or a hot dog with chips. He has peanut butter crackers or pepperoni and crackers or peanut butter nabs midafternoon. Dinner can be meat and potatoes or pork chop mashed potatoes and green beans or vegetable soup or goulash. Examination  Blood pressure 126/81  Pulse 68  Weight 261    Diabetic foot exam:     Left Foot:   Visual Exam: normal    Pulse DP: 2+ (normal)   Filament test: normal sensation    Vibratory sensation: normal      Right Foot:   Visual Exam: normal    Pulse DP: 2+ (normal)   Filament test: absent sensation    Vibratory sensation: absent    Impression type 2 diabetes mellitus with deteriorating glucose control and now with a diagnosis of adenocarcinoma the lung. Plan:   1. With respect to the diabetes, I have instructed him to decrease the carbohydrate intake for dinner significantly so that his blood sugar at bedtime is down to less than 150. He cannot have the PET scan unless his blood sugars are in acceptable range.   We will continue the 60 units twice daily for now as well as the Bydureon. 2.  Regarding the adenocarcinoma, he is scheduled for the PET scan in 2 weeks and again goal is normal blood sugars to give him on optimal scan. He is scheduled for XRT as his only treatment. We spent more than 25 mintutes face to face, more than 50% was in counseling regarding diet, exercise and carbohydrate intake.

## 2020-05-20 RX ORDER — INSULIN LISPRO 100 [IU]/ML
INJECTION, SUSPENSION SUBCUTANEOUS
Qty: 105 ML | Refills: 3 | Status: SHIPPED | OUTPATIENT
Start: 2020-05-20 | End: 2021-05-19

## 2021-01-15 RX ORDER — METFORMIN HYDROCHLORIDE 1000 MG/1
TABLET ORAL
Qty: 180 TAB | Refills: 3 | Status: ON HOLD | OUTPATIENT
Start: 2021-01-15 | End: 2022-04-05 | Stop reason: ALTCHOICE

## 2021-01-15 NOTE — TELEPHONE ENCOUNTER
Requested Prescriptions     Pending Prescriptions Disp Refills    metFORMIN (GLUCOPHAGE) 1,000 mg tablet 180 Tab 3     Sig: TAKE 1 TABLET TWICE A DAY WITH MEALS

## 2021-03-22 RX ORDER — EXENATIDE 2 MG/.85ML
INJECTION, SUSPENSION, EXTENDED RELEASE SUBCUTANEOUS
Qty: 10.2 ML | Refills: 3 | Status: SHIPPED | OUTPATIENT
Start: 2021-03-22

## 2021-05-21 ENCOUNTER — TELEPHONE (OUTPATIENT)
Dept: ENDOCRINOLOGY | Age: 71
End: 2021-05-21

## 2021-05-21 NOTE — TELEPHONE ENCOUNTER
Received a fax from [id number] stating that there are no instructions written on the Humalog 75/25 prescription that was sent on 5/19/2021. Please resend the prescription with the directions.

## 2021-05-22 RX ORDER — INSULIN LISPRO 100 [IU]/ML
INJECTION, SUSPENSION SUBCUTANEOUS
Qty: 105 ML | Refills: 0 | Status: SHIPPED | OUTPATIENT
Start: 2021-05-22 | End: 2021-05-25 | Stop reason: SDUPTHER

## 2021-05-25 RX ORDER — INSULIN LISPRO 100 [IU]/ML
INJECTION, SUSPENSION SUBCUTANEOUS
Qty: 105 ML | Refills: 0 | Status: SHIPPED | OUTPATIENT
Start: 2021-05-25

## 2022-01-18 ENCOUNTER — HOSPITAL ENCOUNTER (OUTPATIENT)
Dept: PET IMAGING | Age: 72
Discharge: HOME | End: 2022-01-18
Attending: INTERNAL MEDICINE
Payer: MEDICARE

## 2022-01-18 VITALS — WEIGHT: 214 LBS | HEIGHT: 70 IN | BODY MASS INDEX: 30.64 KG/M2

## 2022-01-18 DIAGNOSIS — C34.31 CANCER OF BRONCHUS OF RIGHT LOWER LOBE (HCC): ICD-10-CM

## 2022-01-18 LAB
GLUCOSE BLD STRIP.AUTO-MCNC: 182 MG/DL (ref 65–117)
SERVICE CMNT-IMP: ABNORMAL

## 2022-01-18 PROCEDURE — A9552 F18 FDG: HCPCS

## 2022-01-18 RX ORDER — FLUDEOXYGLUCOSE F-18 200 MCI/ML
10 INJECTION INTRAVENOUS ONCE
Status: COMPLETED | OUTPATIENT
Start: 2022-01-18 | End: 2022-01-18

## 2022-01-18 RX ADMIN — FLUDEOXYGLUCOSE F-18 10 MILLICURIE: 200 INJECTION INTRAVENOUS at 10:03

## 2022-01-27 ENCOUNTER — TRANSCRIBE ORDER (OUTPATIENT)
Dept: SCHEDULING | Age: 72
End: 2022-01-27

## 2022-01-27 DIAGNOSIS — M54.14 THORACIC RADICULOPATHY: Primary | ICD-10-CM

## 2022-01-27 DIAGNOSIS — C34.90 MALIGNANT NEOPLASM OF LUNG, UNSPECIFIED LATERALITY, UNSPECIFIED PART OF LUNG (HCC): ICD-10-CM

## 2022-01-27 DIAGNOSIS — M54.14 THORACIC RADICULITIS: Primary | ICD-10-CM

## 2022-01-27 DIAGNOSIS — D49.2 THORACIC SPINE TUMOR: ICD-10-CM

## 2022-01-31 ENCOUNTER — HOSPITAL ENCOUNTER (OUTPATIENT)
Dept: CT IMAGING | Age: 72
Discharge: HOME | End: 2022-01-31
Attending: ORTHOPAEDIC SURGERY
Payer: MEDICARE

## 2022-01-31 ENCOUNTER — HOSPITAL ENCOUNTER (OUTPATIENT)
Dept: MRI IMAGING | Age: 72
Discharge: HOME | End: 2022-01-31
Attending: ORTHOPAEDIC SURGERY
Payer: MEDICARE

## 2022-01-31 VITALS — BODY MASS INDEX: 30.42 KG/M2 | WEIGHT: 212 LBS

## 2022-01-31 DIAGNOSIS — D49.2 THORACIC SPINE TUMOR: ICD-10-CM

## 2022-01-31 DIAGNOSIS — M54.14 THORACIC RADICULITIS: ICD-10-CM

## 2022-01-31 DIAGNOSIS — C34.90 MALIGNANT NEOPLASM OF LUNG, UNSPECIFIED LATERALITY, UNSPECIFIED PART OF LUNG (HCC): ICD-10-CM

## 2022-01-31 DIAGNOSIS — M54.14 THORACIC RADICULOPATHY: ICD-10-CM

## 2022-01-31 PROCEDURE — 72128 CT CHEST SPINE W/O DYE: CPT

## 2022-01-31 PROCEDURE — A9576 INJ PROHANCE MULTIPACK: HCPCS | Performed by: ORTHOPAEDIC SURGERY

## 2022-01-31 PROCEDURE — 74011250636 HC RX REV CODE- 250/636: Performed by: ORTHOPAEDIC SURGERY

## 2022-01-31 PROCEDURE — 72157 MRI CHEST SPINE W/O & W/DYE: CPT

## 2022-01-31 RX ADMIN — GADOTERIDOL 19 ML: 279.3 INJECTION, SOLUTION INTRAVENOUS at 15:58

## 2022-03-19 PROBLEM — E66.01 SEVERE OBESITY (BMI 35.0-39.9) WITH COMORBIDITY (HCC): Status: ACTIVE | Noted: 2018-05-07

## 2022-03-20 PROBLEM — E11.21 TYPE 2 DIABETES WITH NEPHROPATHY (HCC): Status: ACTIVE | Noted: 2018-05-07

## 2022-03-23 ENCOUNTER — TRANSCRIBE ORDER (OUTPATIENT)
Dept: SCHEDULING | Age: 72
End: 2022-03-23

## 2022-03-24 ENCOUNTER — APPOINTMENT (OUTPATIENT)
Dept: CT IMAGING | Age: 72
End: 2022-03-24
Payer: MEDICARE

## 2022-03-24 ENCOUNTER — HOSPITAL ENCOUNTER (OUTPATIENT)
Dept: CT IMAGING | Age: 72
Discharge: HOME | End: 2022-03-24
Attending: INTERNAL MEDICINE
Payer: MEDICARE

## 2022-03-24 DIAGNOSIS — C34.31 CANCER OF BRONCHUS OF RIGHT LOWER LOBE (HCC): ICD-10-CM

## 2022-03-24 DIAGNOSIS — C34.01 LUNG CANCER, MAIN BRONCHUS, RIGHT (HCC): ICD-10-CM

## 2022-03-24 PROCEDURE — 74011000636 HC RX REV CODE- 636: Performed by: RADIOLOGY

## 2022-03-24 PROCEDURE — 74011000250 HC RX REV CODE- 250: Performed by: RADIOLOGY

## 2022-03-24 PROCEDURE — 74177 CT ABD & PELVIS W/CONTRAST: CPT

## 2022-03-24 PROCEDURE — 71260 CT THORAX DX C+: CPT

## 2022-03-24 RX ORDER — BARIUM SULFATE 20 MG/ML
900 SUSPENSION ORAL
Status: COMPLETED | OUTPATIENT
Start: 2022-03-24 | End: 2022-03-24

## 2022-03-24 RX ADMIN — BARIUM SULFATE 900 ML: 20 SUSPENSION ORAL at 13:26

## 2022-03-24 RX ADMIN — IOPAMIDOL 100 ML: 755 INJECTION, SOLUTION INTRAVENOUS at 13:26

## 2022-04-04 ENCOUNTER — HOSPITAL ENCOUNTER (OUTPATIENT)
Age: 72
Setting detail: OBSERVATION
Discharge: HOME | End: 2022-04-05
Attending: EMERGENCY MEDICINE | Admitting: FAMILY MEDICINE
Payer: MEDICARE

## 2022-04-04 ENCOUNTER — TRANSCRIBE ORDER (OUTPATIENT)
Dept: GENERAL RADIOLOGY | Age: 72
End: 2022-04-04

## 2022-04-04 ENCOUNTER — APPOINTMENT (OUTPATIENT)
Dept: GENERAL RADIOLOGY | Age: 72
End: 2022-04-04
Attending: FAMILY MEDICINE
Payer: MEDICARE

## 2022-04-04 ENCOUNTER — HOSPITAL ENCOUNTER (OUTPATIENT)
Dept: MRI IMAGING | Age: 72
Discharge: HOME | End: 2022-04-04
Attending: RADIOLOGY
Payer: MEDICARE

## 2022-04-04 ENCOUNTER — APPOINTMENT (OUTPATIENT)
Dept: CT IMAGING | Age: 72
End: 2022-04-04
Attending: FAMILY MEDICINE
Payer: MEDICARE

## 2022-04-04 DIAGNOSIS — C79.49: Primary | ICD-10-CM

## 2022-04-04 DIAGNOSIS — I26.99 OTHER ACUTE PULMONARY EMBOLISM WITHOUT ACUTE COR PULMONALE (HCC): Primary | ICD-10-CM

## 2022-04-04 DIAGNOSIS — R09.02 HYPOXIA: ICD-10-CM

## 2022-04-04 DIAGNOSIS — R53.1 WEAKNESS: ICD-10-CM

## 2022-04-04 DIAGNOSIS — C79.49: ICD-10-CM

## 2022-04-04 LAB
ALBUMIN SERPL-MCNC: 2.7 G/DL (ref 3.5–5)
ALBUMIN/GLOB SERPL: 0.7 {RATIO} (ref 1.1–2.2)
ALP SERPL-CCNC: 246 U/L (ref 45–117)
ALT SERPL-CCNC: 42 U/L (ref 12–78)
ANION GAP SERPL CALC-SCNC: 8 MMOL/L (ref 5–15)
APPEARANCE UR: CLEAR
AST SERPL-CCNC: 24 U/L (ref 15–37)
BACTERIA URNS QL MICRO: ABNORMAL /HPF
BASOPHILS # BLD: 0 K/UL (ref 0–0.1)
BASOPHILS NFR BLD: 0 % (ref 0–1)
BILIRUB SERPL-MCNC: 1.3 MG/DL (ref 0.2–1)
BILIRUB UR QL: NEGATIVE
BNP SERPL-MCNC: 87 PG/ML (ref 0–125)
BUN SERPL-MCNC: 14 MG/DL (ref 6–20)
BUN/CREAT SERPL: 17 (ref 12–20)
CALCIUM SERPL-MCNC: 8.7 MG/DL (ref 8.5–10.1)
CHLORIDE SERPL-SCNC: 98 MMOL/L (ref 97–108)
CO2 SERPL-SCNC: 29 MMOL/L (ref 21–32)
COLOR UR: ABNORMAL
COVID-19 RAPID TEST, COVR: NOT DETECTED
CREAT SERPL-MCNC: 0.82 MG/DL (ref 0.7–1.3)
D DIMER PPP FEU-MCNC: 5.18 MG/L FEU (ref 0–0.65)
DIFFERENTIAL METHOD BLD: ABNORMAL
EOSINOPHIL # BLD: 0.8 K/UL (ref 0–0.4)
EOSINOPHIL NFR BLD: 7 % (ref 0–7)
EPITH CASTS URNS QL MICRO: ABNORMAL /LPF
ERYTHROCYTE [DISTWIDTH] IN BLOOD BY AUTOMATED COUNT: 15.3 % (ref 11.5–14.5)
GLOBULIN SER CALC-MCNC: 4 G/DL (ref 2–4)
GLUCOSE BLD STRIP.AUTO-MCNC: 215 MG/DL (ref 65–117)
GLUCOSE SERPL-MCNC: 119 MG/DL (ref 65–100)
GLUCOSE UR STRIP.AUTO-MCNC: NEGATIVE MG/DL
HCT VFR BLD AUTO: 35.9 % (ref 36.6–50.3)
HGB BLD-MCNC: 12.1 G/DL (ref 12.1–17)
HGB UR QL STRIP: NEGATIVE
IMM GRANULOCYTES # BLD AUTO: 0.1 K/UL (ref 0–0.04)
IMM GRANULOCYTES NFR BLD AUTO: 1 % (ref 0–0.5)
KETONES UR QL STRIP.AUTO: NEGATIVE MG/DL
LEUKOCYTE ESTERASE UR QL STRIP.AUTO: NEGATIVE
LYMPHOCYTES # BLD: 0.8 K/UL (ref 0.8–3.5)
LYMPHOCYTES NFR BLD: 7 % (ref 12–49)
MCH RBC QN AUTO: 33.2 PG (ref 26–34)
MCHC RBC AUTO-ENTMCNC: 33.7 G/DL (ref 30–36.5)
MCV RBC AUTO: 98.4 FL (ref 80–99)
MONOCYTES # BLD: 1.2 K/UL (ref 0–1)
MONOCYTES NFR BLD: 11 % (ref 5–13)
NEUTS SEG # BLD: 7.9 K/UL (ref 1.8–8)
NEUTS SEG NFR BLD: 74 % (ref 32–75)
NITRITE UR QL STRIP.AUTO: NEGATIVE
NRBC # BLD: 0 K/UL (ref 0–0.01)
NRBC BLD-RTO: 0 PER 100 WBC
PH UR STRIP: 6.5 [PH] (ref 5–8)
PLATELET # BLD AUTO: 218 K/UL (ref 150–400)
PMV BLD AUTO: 10 FL (ref 8.9–12.9)
POTASSIUM SERPL-SCNC: 3.8 MMOL/L (ref 3.5–5.1)
PROT SERPL-MCNC: 6.7 G/DL (ref 6.4–8.2)
PROT UR STRIP-MCNC: NEGATIVE MG/DL
RBC # BLD AUTO: 3.65 M/UL (ref 4.1–5.7)
RBC #/AREA URNS HPF: ABNORMAL /HPF (ref 0–5)
RBC MORPH BLD: ABNORMAL
SERVICE CMNT-IMP: ABNORMAL
SODIUM SERPL-SCNC: 135 MMOL/L (ref 136–145)
SOURCE, COVRS: NORMAL
SP GR UR REFRACTOMETRY: <1.005 (ref 1–1.03)
TROPONIN-HIGH SENSITIVITY: 7 NG/L (ref 0–76)
UROBILINOGEN UR QL STRIP.AUTO: 1 EU/DL (ref 0.2–1)
WBC # BLD AUTO: 10.8 K/UL (ref 4.1–11.1)
WBC URNS QL MICRO: ABNORMAL /HPF (ref 0–4)

## 2022-04-04 PROCEDURE — 80053 COMPREHEN METABOLIC PANEL: CPT

## 2022-04-04 PROCEDURE — 74011000636 HC RX REV CODE- 636: Performed by: EMERGENCY MEDICINE

## 2022-04-04 PROCEDURE — 83880 ASSAY OF NATRIURETIC PEPTIDE: CPT

## 2022-04-04 PROCEDURE — G0378 HOSPITAL OBSERVATION PER HR: HCPCS

## 2022-04-04 PROCEDURE — 96374 THER/PROPH/DIAG INJ IV PUSH: CPT

## 2022-04-04 PROCEDURE — 74011250637 HC RX REV CODE- 250/637: Performed by: EMERGENCY MEDICINE

## 2022-04-04 PROCEDURE — 85025 COMPLETE CBC W/AUTO DIFF WBC: CPT

## 2022-04-04 PROCEDURE — 71275 CT ANGIOGRAPHY CHEST: CPT

## 2022-04-04 PROCEDURE — 36415 COLL VENOUS BLD VENIPUNCTURE: CPT

## 2022-04-04 PROCEDURE — 87635 SARS-COV-2 COVID-19 AMP PRB: CPT

## 2022-04-04 PROCEDURE — 93005 ELECTROCARDIOGRAM TRACING: CPT

## 2022-04-04 PROCEDURE — 81001 URINALYSIS AUTO W/SCOPE: CPT

## 2022-04-04 PROCEDURE — 96372 THER/PROPH/DIAG INJ SC/IM: CPT

## 2022-04-04 PROCEDURE — 82962 GLUCOSE BLOOD TEST: CPT

## 2022-04-04 PROCEDURE — 74011250636 HC RX REV CODE- 250/636: Performed by: EMERGENCY MEDICINE

## 2022-04-04 PROCEDURE — 84484 ASSAY OF TROPONIN QUANT: CPT

## 2022-04-04 PROCEDURE — 85379 FIBRIN DEGRADATION QUANT: CPT

## 2022-04-04 PROCEDURE — 99285 EMERGENCY DEPT VISIT HI MDM: CPT

## 2022-04-04 PROCEDURE — 74011250636 HC RX REV CODE- 250/636: Performed by: FAMILY MEDICINE

## 2022-04-04 PROCEDURE — 71046 X-RAY EXAM CHEST 2 VIEWS: CPT

## 2022-04-04 PROCEDURE — 72146 MRI CHEST SPINE W/O DYE: CPT

## 2022-04-04 RX ORDER — ENOXAPARIN SODIUM 100 MG/ML
1 INJECTION SUBCUTANEOUS
Status: COMPLETED | OUTPATIENT
Start: 2022-04-04 | End: 2022-04-04

## 2022-04-04 RX ORDER — OXYCODONE HYDROCHLORIDE 5 MG/1
10 TABLET ORAL
Status: COMPLETED | OUTPATIENT
Start: 2022-04-04 | End: 2022-04-04

## 2022-04-04 RX ORDER — HYDROMORPHONE HYDROCHLORIDE 1 MG/ML
1 INJECTION, SOLUTION INTRAMUSCULAR; INTRAVENOUS; SUBCUTANEOUS ONCE
Status: DISCONTINUED | OUTPATIENT
Start: 2022-04-04 | End: 2022-04-04

## 2022-04-04 RX ORDER — FENTANYL CITRATE 50 UG/ML
50 INJECTION, SOLUTION INTRAMUSCULAR; INTRAVENOUS ONCE
Status: COMPLETED | OUTPATIENT
Start: 2022-04-04 | End: 2022-04-04

## 2022-04-04 RX ADMIN — ENOXAPARIN SODIUM 100 MG: 100 INJECTION SUBCUTANEOUS at 19:16

## 2022-04-04 RX ADMIN — OXYCODONE 10 MG: 5 TABLET ORAL at 21:05

## 2022-04-04 RX ADMIN — FENTANYL CITRATE 50 MCG: 50 INJECTION INTRAMUSCULAR; INTRAVENOUS at 17:32

## 2022-04-04 RX ADMIN — IOPAMIDOL 80 ML: 755 INJECTION, SOLUTION INTRAVENOUS at 17:56

## 2022-04-04 NOTE — ED TRIAGE NOTES
Patient arrives by wheelchair with wife with c/o back pain. Patient has a hx of lung cancer that was removed 5 years ago with metastasis to T6.

## 2022-04-04 NOTE — ED PROVIDER NOTES
Patient is a 33-year-old male with past medical history of lung cancer with mets to the spine, CAD, COPD, CHF, diabetes who presents for evaluation of a 3-day history of increased weakness, episodes of diaphoresis. His wife is with him and helps provide history. She reports that he is currently undergoing treatment at [facility] for his cancer. He has been doing pretty well, however, over the last 3 days he has had difficulty with his daily tasks. He normally is able to go for walks and walk around the house on his own. He now endorses that his legs feel \"like rubber\" and he feels weak. He has now been using the urinal instead of getting up and using the bathroom. He has additionally had episodes of diaphoresis. He is unsure if he has had fevers, has not checked his temperature at home. He denies any new shortness of breath out that is not his baseline. He denies any chest pain. He has not had any urinary symptoms. His wife states his pulse ox typically runs between 93 to 94% at home.                Past Medical History:   Diagnosis Date    Arthritis     CAD (coronary artery disease)     Congestive heart failure (HCC)     Diabetes (HCC)     High cholesterol        Past Surgical History:   Procedure Laterality Date    HX HERNIA REPAIR      Abd         Family History:   Problem Relation Age of Onset    Diabetes Mother     Diabetes Father     Diabetes Brother     Diabetes Maternal Grandmother        Social History     Socioeconomic History    Marital status:      Spouse name: Not on file    Number of children: Not on file    Years of education: Not on file    Highest education level: Not on file   Occupational History    Not on file   Tobacco Use    Smoking status: Former Smoker     Packs/day: 0.50     Types: Cigarettes    Smokeless tobacco: Never Used   Substance and Sexual Activity    Alcohol use: No     Alcohol/week: 0.0 standard drinks    Drug use: No    Sexual activity: Not on file Other Topics Concern    Not on file   Social History Narrative    Not on file     Social Determinants of Health     Financial Resource Strain:     Difficulty of Paying Living Expenses: Not on file   Food Insecurity:     Worried About Running Out of Food in the Last Year: Not on file    Fiona of Food in the Last Year: Not on file   Transportation Needs:     Lack of Transportation (Medical): Not on file    Lack of Transportation (Non-Medical): Not on file   Physical Activity:     Days of Exercise per Week: Not on file    Minutes of Exercise per Session: Not on file   Stress:     Feeling of Stress : Not on file   Social Connections:     Frequency of Communication with Friends and Family: Not on file    Frequency of Social Gatherings with Friends and Family: Not on file    Attends [employer] Services: Not on file    Active Member of [address] [place] [address] or Organizations: Not on file    Attends Club or Organization Meetings: Not on file    Marital Status: Not on file   Intimate Partner Violence:     Fear of Current or Ex-Partner: Not on file    Emotionally Abused: Not on file    Physically Abused: Not on file    Sexually Abused: Not on file   Housing Stability:     Unable to Pay for Housing in the Last Year: Not on file    Number of [place] in the Last Year: Not on file    Unstable Housing in the Last Year: Not on file         ALLERGIES: Other plant, animal, environmental    Review of Systems   Constitutional: Positive for diaphoresis and fatigue. Negative for unexpected weight change. HENT: Negative for congestion. Eyes: Negative for visual disturbance. Respiratory: Negative for cough, chest tightness and shortness of breath. Cardiovascular: Negative for chest pain. Gastrointestinal: Negative for abdominal pain. Endocrine: Negative for polyuria. Genitourinary: Negative for dysuria and flank pain. Musculoskeletal: Positive for back pain. Skin: Negative for color change. Allergic/Immunologic: Negative for immunocompromised state. Neurological: Positive for weakness. Negative for dizziness and headaches. Hematological: Negative for adenopathy. Psychiatric/Behavioral: Negative for agitation. Vitals:    04/04/22 1546   BP: 130/71   Pulse: 94   Resp: 16   Temp: 97.8 °F (36.6 °C)   SpO2: 92%   Weight: 96.3 kg (212 lb 4.9 oz)            Physical Exam  Vitals and nursing note reviewed. Constitutional:       Appearance: Normal appearance. He is obese. HENT:      Head: Atraumatic. Eyes:      Extraocular Movements: Extraocular movements intact. Conjunctiva/sclera: Conjunctivae normal.      Pupils: Pupils are equal, round, and reactive to light. Cardiovascular:      Rate and Rhythm: Normal rate and regular rhythm. Pulses: Normal pulses. Heart sounds: Normal heart sounds. Pulmonary:      Effort: Pulmonary effort is normal. No respiratory distress. Breath sounds: Normal breath sounds. No wheezing. Abdominal:      General: Abdomen is flat. Bowel sounds are normal.      Tenderness: There is no abdominal tenderness. There is no right CVA tenderness, left CVA tenderness, guarding or rebound. Musculoskeletal:         General: Normal range of motion. Cervical back: Neck supple. Right lower leg: No edema. Left lower leg: No edema. Skin:     General: Skin is warm and dry. Capillary Refill: Capillary refill takes less than 2 seconds. Neurological:      General: No focal deficit present. Mental Status: He is alert and oriented to person, place, and time. Mental status is at baseline. Psychiatric:         Mood and Affect: Mood normal.         Behavior: Behavior normal.          MDM  Number of Diagnoses or Management Options  Hypoxia  Other acute pulmonary embolism without acute cor pulmonale (HCC)  Weakness  Diagnosis management comments: Patient with history of active cancer presenting with new weakness, diaphoresis.   Will obtain broad work-up to rule out infection. When standing and pivoting to bed, patient noted to have a pulse ox in the 80s. We will add on cardiac work-up, chest x-ray. ED EKG interpretation:4:18 PM  Rhythm: normal sinus rhythm; and regular. Rate (approx.): 90; Axis: normal; P wave: normal; ST/T wave: no concerning ST elevations or depressions; Other findings: unremarkable. EKG has also been evaluated by attending ED physician. 1755 -labs unremarkable, CTA of chest with evidence of right pulmonary emboli without evidence of heart strain. Will admit for hypoxia upon ambulation, PE. Will start patient on Lovenox. Edwin Vagn NP       Amount and/or Complexity of Data Reviewed  Clinical lab tests: ordered and reviewed  Tests in the radiology section of CPT®: reviewed and ordered  Decide to obtain previous medical records or to obtain history from someone other than the patient: yes  Discuss the patient with other providers: yes    Patient Progress  Patient progress: stable         Procedures      Perfect Serve Consult for Admission  6:49 PM    ED Room Number: SER06/06  Patient Name and age:  Ksenia Flanagan 66 y.o.  male  Working Diagnosis:   1. Other acute pulmonary embolism without acute cor pulmonale ([facility] [address]    2. Hypoxia    3. Weakness        COVID-19 Suspicion:  no  Sepsis present:  no  Reassessment needed: no  Code Status:  Full Code  Readmission: no  Isolation Requirements:  no  Recommended Level of Care:  step down  Department:  [place] [facility] - [phone]    Other: 57-year-old male with past medical history of lung cancer with mets to spinal cord presenting with 3-day history of weakness, difficulty with ambulation, episodes of diaphoresis. On exam, hypoxic to 85% during ambulation. CTA of chest significant for pulmonary embolism, no evidence of heart strain. Patient's oncologist is at [address] [place] [address], patient prefers admission to consult course at this time.

## 2022-04-05 VITALS
DIASTOLIC BLOOD PRESSURE: 67 MMHG | TEMPERATURE: 98 F | OXYGEN SATURATION: 94 % | BODY MASS INDEX: 30.46 KG/M2 | SYSTOLIC BLOOD PRESSURE: 111 MMHG | WEIGHT: 212.3 LBS | HEART RATE: 96 BPM | RESPIRATION RATE: 20 BRPM

## 2022-04-05 LAB
ATRIAL RATE: 90 BPM
CALCULATED P AXIS, ECG09: 77 DEGREES
CALCULATED R AXIS, ECG10: 58 DEGREES
CALCULATED T AXIS, ECG11: 74 DEGREES
DIAGNOSIS, 93000: NORMAL
GLUCOSE BLD STRIP.AUTO-MCNC: 199 MG/DL (ref 65–117)
GLUCOSE BLD STRIP.AUTO-MCNC: 340 MG/DL (ref 65–117)
GLUCOSE BLD STRIP.AUTO-MCNC: 358 MG/DL (ref 65–117)
P-R INTERVAL, ECG05: 190 MS
Q-T INTERVAL, ECG07: 380 MS
QRS DURATION, ECG06: 96 MS
QTC CALCULATION (BEZET), ECG08: 464 MS
SERVICE CMNT-IMP: ABNORMAL
VENTRICULAR RATE, ECG03: 90 BPM

## 2022-04-05 PROCEDURE — 77030029684 HC NEB SM VOL KT MONA -A

## 2022-04-05 PROCEDURE — 74011636637 HC RX REV CODE- 636/637: Performed by: STUDENT IN AN ORGANIZED HEALTH CARE EDUCATION/TRAINING PROGRAM

## 2022-04-05 PROCEDURE — 74011250636 HC RX REV CODE- 250/636: Performed by: INTERNAL MEDICINE

## 2022-04-05 PROCEDURE — 96375 TX/PRO/DX INJ NEW DRUG ADDON: CPT

## 2022-04-05 PROCEDURE — G0378 HOSPITAL OBSERVATION PER HR: HCPCS

## 2022-04-05 PROCEDURE — 94664 DEMO&/EVAL PT USE INHALER: CPT

## 2022-04-05 PROCEDURE — 74011250636 HC RX REV CODE- 250/636: Performed by: STUDENT IN AN ORGANIZED HEALTH CARE EDUCATION/TRAINING PROGRAM

## 2022-04-05 PROCEDURE — 82962 GLUCOSE BLOOD TEST: CPT

## 2022-04-05 PROCEDURE — 74011000250 HC RX REV CODE- 250: Performed by: STUDENT IN AN ORGANIZED HEALTH CARE EDUCATION/TRAINING PROGRAM

## 2022-04-05 PROCEDURE — 74011250637 HC RX REV CODE- 250/637: Performed by: INTERNAL MEDICINE

## 2022-04-05 PROCEDURE — 96372 THER/PROPH/DIAG INJ SC/IM: CPT

## 2022-04-05 PROCEDURE — 74011250637 HC RX REV CODE- 250/637: Performed by: STUDENT IN AN ORGANIZED HEALTH CARE EDUCATION/TRAINING PROGRAM

## 2022-04-05 PROCEDURE — 97116 GAIT TRAINING THERAPY: CPT

## 2022-04-05 PROCEDURE — 97161 PT EVAL LOW COMPLEX 20 MIN: CPT

## 2022-04-05 PROCEDURE — 94640 AIRWAY INHALATION TREATMENT: CPT

## 2022-04-05 RX ORDER — OXYCODONE HYDROCHLORIDE 5 MG/1
5 TABLET ORAL
Status: DISCONTINUED | OUTPATIENT
Start: 2022-04-05 | End: 2022-04-05 | Stop reason: HOSPADM

## 2022-04-05 RX ORDER — FAMOTIDINE 20 MG/1
20 TABLET, FILM COATED ORAL DAILY
Status: DISCONTINUED | OUTPATIENT
Start: 2022-04-05 | End: 2022-04-05 | Stop reason: HOSPADM

## 2022-04-05 RX ORDER — INSULIN LISPRO 100 [IU]/ML
INJECTION, SOLUTION INTRAVENOUS; SUBCUTANEOUS
Status: DISCONTINUED | OUTPATIENT
Start: 2022-04-05 | End: 2022-04-05 | Stop reason: HOSPADM

## 2022-04-05 RX ORDER — IPRATROPIUM BROMIDE AND ALBUTEROL SULFATE 2.5; .5 MG/3ML; MG/3ML
3 SOLUTION RESPIRATORY (INHALATION)
Status: DISCONTINUED | OUTPATIENT
Start: 2022-04-05 | End: 2022-04-05 | Stop reason: HOSPADM

## 2022-04-05 RX ORDER — ENOXAPARIN SODIUM 100 MG/ML
1 INJECTION SUBCUTANEOUS EVERY 24 HOURS
Status: DISCONTINUED | OUTPATIENT
Start: 2022-04-05 | End: 2022-04-05 | Stop reason: DRUGHIGH

## 2022-04-05 RX ORDER — INSULIN GLARGINE 100 [IU]/ML
0.2 INJECTION, SOLUTION SUBCUTANEOUS
Status: DISCONTINUED | OUTPATIENT
Start: 2022-04-05 | End: 2022-04-05 | Stop reason: HOSPADM

## 2022-04-05 RX ORDER — ENOXAPARIN SODIUM 100 MG/ML
1 INJECTION SUBCUTANEOUS EVERY 12 HOURS
Status: DISCONTINUED | OUTPATIENT
Start: 2022-04-05 | End: 2022-04-05

## 2022-04-05 RX ORDER — MAGNESIUM SULFATE 100 %
4 CRYSTALS MISCELLANEOUS AS NEEDED
Status: DISCONTINUED | OUTPATIENT
Start: 2022-04-05 | End: 2022-04-05 | Stop reason: HOSPADM

## 2022-04-05 RX ORDER — TAMSULOSIN HYDROCHLORIDE 0.4 MG/1
0.4 CAPSULE ORAL DAILY
COMMUNITY
Start: 2022-03-10

## 2022-04-05 RX ORDER — OXYCODONE HYDROCHLORIDE 5 MG/1
5 TABLET ORAL
COMMUNITY
Start: 2022-03-22 | End: 2022-04-21

## 2022-04-05 RX ORDER — ENOXAPARIN SODIUM 100 MG/ML
1 INJECTION SUBCUTANEOUS EVERY 12 HOURS
Status: DISCONTINUED | OUTPATIENT
Start: 2022-04-05 | End: 2022-04-05 | Stop reason: HOSPADM

## 2022-04-05 RX ORDER — CARVEDILOL 3.12 MG/1
3.12 TABLET ORAL 2 TIMES DAILY WITH MEALS
Status: DISCONTINUED | OUTPATIENT
Start: 2022-04-05 | End: 2022-04-05

## 2022-04-05 RX ORDER — DEXAMETHASONE 4 MG/1
4 TABLET ORAL EVERY 12 HOURS
Status: DISCONTINUED | OUTPATIENT
Start: 2022-04-05 | End: 2022-04-05 | Stop reason: HOSPADM

## 2022-04-05 RX ADMIN — IPRATROPIUM BROMIDE AND ALBUTEROL SULFATE 3 ML: .5; 3 SOLUTION RESPIRATORY (INHALATION) at 07:28

## 2022-04-05 RX ADMIN — ENOXAPARIN SODIUM 100 MG: 100 INJECTION SUBCUTANEOUS at 08:49

## 2022-04-05 RX ADMIN — OXYCODONE HYDROCHLORIDE 5 MG: 5 TABLET ORAL at 11:53

## 2022-04-05 RX ADMIN — FAMOTIDINE 20 MG: 20 TABLET ORAL at 04:44

## 2022-04-05 RX ADMIN — CARVEDILOL 3.12 MG: 3.12 TABLET, FILM COATED ORAL at 08:49

## 2022-04-05 RX ADMIN — METHYLPREDNISOLONE SODIUM SUCCINATE 80 MG: 125 INJECTION, POWDER, FOR SOLUTION INTRAMUSCULAR; INTRAVENOUS at 04:44

## 2022-04-05 RX ADMIN — DEXAMETHASONE 4 MG: 4 TABLET ORAL at 11:57

## 2022-04-05 RX ADMIN — ENOXAPARIN SODIUM 100 MG: 100 INJECTION SUBCUTANEOUS at 17:00

## 2022-04-05 RX ADMIN — IPRATROPIUM BROMIDE AND ALBUTEROL SULFATE 3 ML: .5; 3 SOLUTION RESPIRATORY (INHALATION) at 15:30

## 2022-04-05 RX ADMIN — Medication 7 UNITS: at 11:57

## 2022-04-05 RX ADMIN — Medication 2 UNITS: at 08:49

## 2022-04-05 NOTE — PROGRESS NOTES
NACHO PLAN;  RUR-N/A  Disposition-Home with partner  Transprtation by partner  F/U with PCP/Specialist    Medicare Outpatient Observation Notice (MOON)/ [place] Outpatient Observation Notice (Petros Folds) provided to patient/representative with verbal explanation of the notice. Time allotted for questions regarding the notice. Patient /representative provided a completed copy of the MOON/VOON notice. Copy placed on bedside chart. Patsy Colon MSA, RN,CRM    Order for outpatient therapy signed by attending and given to patient. Xarelto discount card also given to patient. No further discharge needs identified.   Patsy Colon MSA, RN,CRM

## 2022-04-05 NOTE — PROGRESS NOTES
Hospital follow-up PCP transitional care appointment has been scheduled with Dr. Luis Ng for Tuesday, 4/12/22 at 11:15 a.m. Pending patient discharge. Francisco Gibson, Care Management Assistant.

## 2022-04-05 NOTE — DISCHARGE SUMMARY
Discharge Summary     PATIENT ID: Mya Montiel  MRN: [id number]   YOB: 1950    DATE OF ADMISSION: 4/4/2022  3:38 PM    DATE OF DISCHARGE: 4/5/2022  PRIMARY CARE PROVIDER: Alexandra Leo MD   DISCHARGING PROVIDER: Shaheen Clifton MD    To contact this individual call [phone] and ask the  to page. If unavailable ask to be transferred the Adult Hospitalist Department. CONSULTATIONS: IP CONSULT TO HOSPITALIST    PROCEDURES/SURGERIES: * No surgery found *    ADMITTING [address] [place] [address] COURSE:   Mya Montiel is a 66 y.o. 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 Patient states over the last week and a half, he has had increased malaise, fatigue and some difficulty with ambulation which he describes as problems with ambulation for long parents as well as occasional difficulty with rising from bed and out of chairs. He states this is not new and he has had worsening of this prior to his cancer diagnosis and subsequent improvement after chemo, radiation and aggressive physical therapy. He states he was seen at [address] [place] [address] over a week ago with these concerns and had multiple scans including PET scan which showed no new spinal or worsening metastasis. He complains of mild baseline dyspnea which he states is worsened over the last week. Mostly associated with exertion. The patient denies any fever, chills, chest or abdominal pain, nausea, vomiting, cough, congestion, recent illness, palpitations, or dysuria.     Remarkable vitals on ER Presentations: vss  Labs Remarkable for: d-dimer: 5.18  ER Images: cta chest:  1. Pulmonary emboli in right middle lobe and lingular pulmonary arteries. 2. Right hilar and mediastinal metastatic disease including right paraesophageal  metastasis inseparable from esophageal margin.   3. Posterior mediastinal mass arising from T6 vertebral body with epidural and  foraminal involvement and canal stenosis/cord compression, shown previously. ER Rx: Lovenox 100mg, oxy 10mg     DISCHARGE DIAGNOSES / PLAN:        Acute Hypoxic Resp Failure -resolved  PEs  COPD no acute exacerbation  sats ok on RA at rest and exertion on 6min walk test  Switch tx Lovenox to xarelto  F/u with heme/onc  Pt understands to avoid nsaids and is at risk of bleeding     Metastatic lung adenocarcinoma  Known to [facility], pt will f/u there  He is supposed to be on chemotherapy with plans to do so with a clinic in [facility]  LE [facility] d/t known epidural and foraminal involvement and canal stenosis/cord compression  Pt having flare up of \"cancer pain,\" around ribs so requesting decadron  Already has leftover decadron from previous so no rx needed, advised to take 4mg decadron daily x5 days and pt will see heme/onc next week  Imaging here unchanged from prior except for interval vertebral compression fracture at T7- however pt is not having pain at/near that area so likely not candidate for intervention at this time (and cannot hold anticoagulation)- pt will f/u OP     Insulin-dependent type 2 diabetes  Cont home insulin  Anticipate steroid hyperglycemia        ADDITIONAL CARE RECOMMENDATIONS: f/u pcp, heme/onc    PENDING TEST RESULTS:   At the time of discharge the following test results are still pending: none    Patient Instructions     FOLLOW UP APPOINTMENTS:    Follow-up Information     Follow up With Specialties Details Why Contact Info    Enzo Cosme MD Internal Medicine   [address]  [phone]           DIET: Cardiac Diet and Diabetic Diet    ACTIVITY: Activity as tolerated    NOTIFY YOUR PHYSICIAN FOR ANY OF THE FOLLOWING:   Fever over 101 degrees for 24 hours.    Chest pain, shortness of breath, fever, chills, nausea, vomiting, diarrhea, change in mentation, falling, weakness, bleeding. Severe pain or pain not relieved by medications. Or, any other signs or symptoms that you may have questions about. DISPOSITION:  x  Home With:   OT  PT  HH  RN       Long term SNF/Inpatient Rehab    Independent/assisted living    Hospice    Other:       PATIENT CONDITION AT DISCHARGE:   Functional status    Poor    x Deconditioned     Independent      Cognition   x  Lucid     Forgetful     Dementia      Catheters/lines (plus indication)    Zuniga     PICC     PEG    x None      Code status    x Full code     DNR      PHYSICAL EXAMINATION AT DISCHARGE:  General:  Alert, cooperative, no distress  Lungs:   Clear to auscultation bilaterally, symmetric expansion, no respiratory distress, no wheezing  Chest wall:  No tenderness or deformity  Heart:   Regular rate and rhythm  Abdomen:   Soft, non-tender  Extremities: Extremities normal, atraumatic, no cyanosis or edema  Skin:  Skin color, texture, turgor normal. No rashes or lesions  Neurologic: CNII-XII intact. NUNEZ. A&Ox3    CHRONIC MEDICAL DIAGNOSES:  Problem List as of 4/5/2022 Date Reviewed: 2/19/2020          Codes Class Noted - Resolved    Pulmonary embolism ([facility] ICD-10-CM: I26.99  ICD-9-CM: 415.19  4/4/2022 - Present        Type 2 diabetes with nephropathy ([facility] ICD-10-CM: E11.21  ICD-9-CM: 250.40, 583.81  5/7/2018 - Present        Severe obesity (BMI 35.0-39. 9) with comorbidity [facility]) ICD-10-CM: E66.01  ICD-9-CM: 278.01  5/7/2018 - Present        Uncontrolled type 2 diabetes mellitus with complication, with long-term current use of insulin ICD-10-CM: VJG4508  ICD-9-CM: 250.82, V58.67  12/15/2017 - Present        COPD (chronic obstructive pulmonary disease) ([facility] ICD-10-CM: J44.9  ICD-9-CM: 496  5/24/2015 - Present        Right-sided heart failure syndrome ([facility] ICD-10-CM: I50.810  ICD-9-CM: 428.0  5/24/2015 - Present        RESOLVED: Heart failure ([facility] ICD-10-CM: I50.9  ICD-9-CM: 428.9  5/21/2015 - 5/24/2015              DISCHARGE MEDICATIONS:  Current Discharge Medication List      START taking these medications    Details   rivaroxaban (XARELTO STARTER LOY) 15 mg (42)- 20 mg (9) DsPk Take one 15 mg tablet twice a day with food for the first 21 days. Then, take one 20 mg tablet once a day with food  Qty: 1 Dose Pack, Refills: 0  Start date: 4/5/2022      rivaroxaban (XARELTO) 20 mg tab tablet Take 1 Tablet by mouth daily (with dinner). Start this after completing the Starter Dose Pack  Qty: 30 Tablet, Refills: 3  Start date: 5/4/2022         CONTINUE these medications which have NOT CHANGED    Details   oxyCODONE IR (ROXICODONE) 5 mg immediate release tablet Take 5 mg by mouth every four (4) hours as needed. tamsulosin (FLOMAX) 0.4 mg capsule Take 0.4 mg by mouth daily. insulin lispro protamin-lispro (HumaLOG Mix 75-25 KwikPen) flexpen 60 units twice daily. :ast refill without appt  Qty: 105 mL, Refills: 0    Associated Diagnoses: Uncontrolled type 2 diabetes mellitus with complication, with long-term current use of insulin      Bydureon BCise 2 mg/0.85 mL atIn INJECT 2 MG UNDER THE SKIN EVERY 7 DAYS  Qty: 10.2 mL, Refills: 3    Associated Diagnoses: Uncontrolled type 2 diabetes mellitus with complication, with long-term current use of insulin      Novofine 32 32 gauge x 1/4\" ndle USE AS DIRECTED FOR INSULIN INJECTIONS THREE TIMES A DAY  Qty: 300 Pen Needle, Refills: 3      FREESTYLE LITE STRIPS strip       ipratropium-albuterol (COMBIVENT RESPIMAT)  mcg/actuation inhaler Take 1 Puff by inhalation every six (6) hours. tiotropium-olodaterol (STIOLTO RESPIMAT) 2.5-2.5 mcg/actuation mist Take 2 Puffs by inhalation daily. cetirizine (ZyrTEC) 10 mg tablet Take 10 mg by mouth daily.          STOP taking these medications       Aspirin, Buffered 81 mg tab Comments:   Reason for Stopping:             Greater than 30 minutes were spent with the patient on counseling and coordination of care    Signed:   Paris Tesfaye MD  4/5/2022  2:41 PM

## 2022-04-05 NOTE — ED NOTES
TRANSFER - OUT REPORT:    Verbal report given to Daphnie Boss RN on Lexy Chow  being transferred to [facility] for routine progression of care       Report consisted of patients Situation, Background, Assessment and   Recommendations(SBAR). Information from the following report(s) SBAR, Kardex, ED Summary, [facility] and Recent Results was reviewed with the receiving nurse. Lines:   Peripheral IV 04/04/22 Right Antecubital (Active)        Opportunity for questions and clarification was provided.       Patient transported with:  [facility] transport

## 2022-04-05 NOTE — PROGRESS NOTES
Bedside and Verbal shift change report given to Gabby Bah (oncoming nurse) by Tripp Bright (offgoing nurse).  Report included the following information SBAR, Kardex, MAR, Accordion, Recent Results and Cardiac Rhythm SR.

## 2022-04-05 NOTE — PROGRESS NOTES
Nomi Jain underwent a 6 min walk test. His initial O2  saturation was 94   % and his baseline heart rate was  100 BPM. He walked from 1200 to 1206 at a distance of 125 feet  .  His post O2  saturation was  90 % and his post walk heart rate was   110 BPM.

## 2022-04-05 NOTE — PROGRESS NOTES
Problem: Mobility Impaired (Adult and Pediatric)  Goal: *Acute Goals and Plan of Care (Insert Text)  Description: FUNCTIONAL STATUS PRIOR TO ADMISSION: pt indep with mobility using SPC until 3 days prior when he experienced B LE weakness and increased FLORES. Of note, pt with h/o lung CA with mets to T6, undergoing radiation through [facility]. Reports plan for additional consultation in [facility] next week. HOME SUPPORT PRIOR TO ADMISSION: The patient lived with significant other  but did not require assist.    Physical Therapy Goals  Initiated 4/5/2022  1. Patient will move from supine to sit and sit to supine  in bed with independence within 7 day(s). 2.  Patient will transfer from bed to chair and chair to bed with independence using the least restrictive device within 7 day(s). 3.  Patient will perform sit to stand with independence within 7 day(s). 4.  Patient will ambulate with modified independence for 150 feet with the least restrictive device within 7 day(s). 5.  Patient will ascend/descend 1 step with supervision/set-up within 7 day(s). Outcome: Not Met   PHYSICAL THERAPY EVALUATION  Patient: Quynh Sanchez (75 y.o. male)  Date: 4/5/2022  Primary Diagnosis: Pulmonary embolism (HCC) [I26.99]        Precautions:        ASSESSMENT  Based on the objective data described below, the patient presents with general weakness, decreased dynamic standing balance, decreased activity tolerance, mild gait dysfunction following admission for PE. Pt received on RA and maintained SpO2 90% with activity. Required use of RW for stability with amb due to mild LE weakness; noted decreased heel strike L>R which improved with increased distance. Pt will benefit from mobilization with nursing staff as tolerated and balance/ gait progression with PT. Recommend follow up outpt PT at d/c. Current Level of Function Impacting Discharge (mobility/balance): SBA with amb using RW    Functional Outcome Measure:   The patient scored 21/28 on the Tinetti outcome measure which is indicative of moderate risk for fall. Other factors to consider for discharge: ongoing CA treatment     Patient will benefit from skilled therapy intervention to address the above noted impairments. PLAN :  Recommendations and Planned Interventions: bed mobility training, transfer training, gait training, therapeutic exercises, patient and family training/education, and therapeutic activities      Frequency/Duration: Patient will be followed by physical therapy:  4 times a week to address goals. Recommendation for discharge: (in order for the patient to meet his/her long term goals)  Outpatient physical therapy follow up recommended for progressive strengthening, high level balance/ mobility training    This discharge recommendation:  Has not yet been discussed the attending provider and/or case management    IF patient discharges home will need the following DME: patient owns DME required for discharge         SUBJECTIVE:   Patient stated I'm a lot better than I was two days ago; I think it was radiation poisoning.     OBJECTIVE DATA SUMMARY:   HISTORY:    Past Medical History:   Diagnosis Date    Arthritis     CAD (coronary artery disease)     Congestive heart failure (HCC)     Diabetes ([facility]     High cholesterol      Past Surgical History:   Procedure Laterality Date    HX HERNIA REPAIR      Abd       Personal factors and/or comorbidities impacting plan of care: lung CA with mets T6, currently undergoing treatment    Home Situation  Home Environment: Private residence  # Steps to Enter: 1  One/Two Story Residence: One story  Living Alone: No  Support Systems: Spouse/Significant Other  Patient Expects to be Discharged to[de-identified] Home  Current DME Used/Available at Home: Cane, straight,Walker, rolling    EXAMINATION/PRESENTATION/DECISION MAKING:   Critical Behavior:              Hearing:   Auditory  Auditory Impairment: None    Range Of Motion:  AROM: Generally decreased, functional                       Strength:    Strength: Generally decreased, functional (L ankle 4/5)                    Tone & Sensation:   Tone: Normal              Sensation: Intact               Coordination:  Coordination: Generally decreased, functional  Vision:      Functional Mobility:  Bed Mobility:     Supine to Sit: Independent  Sit to Supine: Independent     Transfers:  Sit to Stand: Supervision  Stand to Sit: Supervision                       Balance:   Sitting: Intact  Standing: Intact; With support  Ambulation/Gait Training:  Distance (ft): 125 Feet (ft)  Assistive Device: Gait belt;Walker, rolling  Ambulation - Level of Assistance: Stand-by assistance        Gait Abnormalities: Decreased step clearance              Speed/Shawna: Pace decreased (<100 feet/min)  Step Length: Left shortened;Right shortened       Functional Measure:  Tinetti test:    Sitting Balance: 1  Arises: 1  Attempts to Rise: 2  Immediate Standing Balance: 2  Standing Balance: 1  Nudged: 2  Eyes Closed: 1  Turn 360 Degrees - Continuous/Discontinuous: 1  Turn 360 Degrees - Steady/Unsteady: 1  Sitting Down: 1  Balance Score: 13 Balance total score  Indication of Gait: 1  R Step Length/Height: 1  L Step Length/Height: 1  R Foot Clearance: 1  L Foot Clearance: 1  Step Symmetry: 1  Step Continuity: 1  Path: 1  Trunk: 0  Walking Time: 0  Gait Score: 8 Gait total score  Total Score: 21/28 Overall total score         Tinetti Tool Score Risk of Falls  <19 = High Fall Risk  19-24 = Moderate Fall Risk  25-28 = Low Fall Risk  Tinetti ME. Performance-Oriented Assessment of Mobility Problems in Elderly Patients. [address]; [id number].  (Scoring Description: PT Bulletin Feb. 10, 1993)    Older adults: Tommie Valdez et al, 2009; n = 1000 [facility] elderly evaluated with ABC, GREGG, ADL, and IADL)  · Mean GREGG score for males aged 69-68 years = 26.21(3.40)  · Mean GREGG score for females age 69-68 years = 25.16(4.30)  · Mean GREGG score for males over 80 years = 23.29(6.02)  · Mean GREGG score for females over 80 years = 17.20(8.32)           Physical Therapy Evaluation Charge Determination   History Examination Presentation Decision-Making   HIGH Complexity :3+ comorbidities / personal factors will impact the outcome/ POC  MEDIUM Complexity : 3 Standardized tests and measures addressing body structure, function, activity limitation and / or participation in recreation  LOW Complexity : Stable, uncomplicated  LOW Complexity : FOTO score of       Based on the above components, the patient evaluation is determined to be of the following complexity level: LOW     Pain Rating:  Pt reports baseline 3/10 back pain    Activity Tolerance:   Good    After treatment patient left in no apparent distress:   Supine in bed, Call bell within reach, and Side rails x 3    COMMUNICATION/EDUCATION:   The patients plan of care was discussed with: Registered nurse. Fall prevention education was provided and the patient/caregiver indicated understanding., Patient/family have participated as able in goal setting and plan of care. , and Patient/family agree to work toward stated goals and plan of care.     Thank you for this referral.  Jolynn Boeck, PT   Time Calculation: 29 mins

## 2022-04-05 NOTE — H&P
History & Physical    Primary Care Provider: Mara Ureña MD  Source of Information: Patient and chart review    History of Presenting Illness:   Nito Valladares is a 66 y.o. 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 Patient states over the last week and a half, he has had increased malaise, fatigue and some difficulty with ambulation which he describes as problems with ambulation for long parents as well as occasional difficulty with rising from bed and out of chairs. He states this is not new and he has had worsening of this prior to his cancer diagnosis and subsequent improvement after chemo, radiation and aggressive physical therapy. He states he was seen at [facility] over a week ago with these concerns and had multiple scans including PET scan which showed no new spinal or worsening metastasis. He complains of mild baseline dyspnea which he states is worsened over the last week. Mostly associated with exertion. The patient denies any fever, chills, chest or abdominal pain, nausea, vomiting, cough, congestion, recent illness, palpitations, or dysuria. Remarkable vitals on ER Presentations: vss  Labs Remarkable for: d-dimer: 5.18  ER Images: cta chest:  1. Pulmonary emboli in right middle lobe and lingular pulmonary arteries. 2. Right hilar and mediastinal metastatic disease including right paraesophageal  metastasis inseparable from esophageal margin. 3. Posterior mediastinal mass arising from T6 vertebral body with epidural and  foraminal involvement and canal stenosis/cord compression, shown previously. ER Rx: Lovenox 100mg, oxy 10mg      Review of Systems:  A comprehensive review of systems was negative except for that written in the History of Present Illness.      Past Medical History:   Diagnosis Date    Arthritis     CAD (coronary artery disease)     Congestive heart failure (HCC)     Diabetes ([facility] [address] [address]     High cholesterol       Past Surgical History:   Procedure Laterality Date    HX HERNIA REPAIR      Abd     Prior to Admission medications    Medication Sig Start Date End Date Taking? Authorizing Provider   insulin lispro protamin-lispro (HumaLOG Mix 75-25 KwikPen) flexpen 60 units twice daily. :ast refill without appt 5/25/21   Megan Hurd MD   Bydureon BCise 2 mg/0.85 mL atIn INJECT 2 MG UNDER THE SKIN EVERY 7 DAYS 3/22/21   Megan Hurd MD   metFORMIN (GLUCOPHAGE) 1,000 mg tablet TAKE 1 TABLET TWICE A DAY WITH MEALS 1/15/21   Megan Hurd MD   Novofine 32 32 gauge x 1/4\" ndle USE AS DIRECTED FOR INSULIN INJECTIONS THREE TIMES A DAY 11/30/20   Megan Hurd MD   CIALIS 20 mg tablet Take 20 mg by mouth as needed. 7/19/18   Provider, Historical   FREESTYLE LITE STRIPS strip  3/1/18   Provider, Historical   furosemide (LASIX) 40 mg tablet Take 1 Tab by mouth daily. 90-day supply for ICD9 416.8 Other chronic pulmonary heart diseases 11/13/17   Rudi Cormier MD   ipratropium-albuterol (COMBIVENT RESPIMAT)  mcg/actuation inhaler Take 1 Puff by inhalation every six (6) hours. Provider, Historical   tiotropium-olodaterol (STIOLTO RESPIMAT) 2.5-2.5 mcg/actuation mist Take 2 Puffs by inhalation daily. Provider, Historical   carvedilol (COREG) 3.125 mg tablet Take 1 Tab by mouth two (2) times daily (with meals). 90-day supply for ICD9 265.1 Diastolic heart failure 35/90/70   Rudi Cormier MD   fluticasone-salmeterol (ADVAIR DISKUS) 250-50 mcg/dose diskus inhaler Take 1 Puff by inhalation every twelve (12) hours. 90-day supply for ICD9 493.2 Chronic obstructive asthma 5/24/15   Kusum Raphael MD   Aspirin, Buffered 81 mg tab Take 81 mg by mouth daily. Provider, Historical   tadalafil (CIALIS) 5 mg tablet Take 5 mg by mouth as needed.     Provider, Historical   cetirizine (ZYRTEC) 10 mg tablet Take 10 mg by mouth daily. Provider, Historical     Allergies   Allergen Reactions    Other Plant, Animal, Environmental Runny Nose     Tree pollen, mold, dust      Family History   Problem Relation Age of Onset    Diabetes Mother     Diabetes Father     Diabetes Brother     Diabetes Maternal Grandmother         SOCIAL HISTORY:  Patient resides:  Independently x   Assisted Living    SNF    With family care       Smoking history:   None x   Former    Chronic      Alcohol history:   None x   Social    Chronic      Ambulates:   Independently x   w/cane    w/walker    w/wc    CODE STATUS:  DNR    Full x   Other      Objective:     Physical Exam:     Visit Vitals  /75   Pulse 98   Temp 98 °F (36.7 °C)   Resp 15   Wt 96.3 kg (212 lb 4.9 oz)   SpO2 93%   BMI 30.46 kg/m²           General:  Alert, cooperative, no distress, appears stated age. Head:  Normocephalic, without obvious abnormality, atraumatic. Eyes:  Conjunctivae/corneas clear. PERRL, EOMs intact. Nose: Nares normal. Septum midline. Mucosa normal.        Neck: Supple, symmetrical, trachea midline. Lungs:   Clear to auscultation bilaterally. Chest wall:  No tenderness or deformity. Heart:  Regular rate and rhythm, S1, S2 normal   Abdomen:   Soft, non-tender. Bowel sounds normal. No masses,  No organomegaly. Extremities: Extremities normal, atraumatic, no cyanosis or edema. Pulses: 2+ and symmetric all extremities. Skin: Skin color, texture, turgor normal. No rashes or lesions   Neurologic: CNII-XII intact. 5/5 strength in bilateral upper and lower extremities. EKG:  NSR  Data Review:     Recent Days:  Recent Labs     04/04/22  1657   WBC 10.8   HGB 12.1   HCT 35.9*        Recent Labs     04/04/22  1657   *   K 3.8   CL 98   CO2 29   *   BUN 14   CREA 0.82   CA 8.7   ALB 2.7*   ALT 42     No results for input(s): PH, PCO2, PO2, HCO3, FIO2 in the last 72 hours.     24 Hour Results:  Recent Results (from the past 24 hour(s))   EKG, 12 LEAD, INITIAL    Collection Time: 04/04/22  4:04 PM   Result Value Ref Range    Ventricular Rate 90 BPM    Atrial Rate 90 BPM    P-R Interval 190 ms    QRS Duration 96 ms    Q-T Interval 380 ms    QTC Calculation (Bezet) 464 ms    Calculated P Axis 77 degrees    Calculated R Axis 58 degrees    Calculated T Axis 74 degrees    Diagnosis       Normal sinus rhythm  Low voltage QRS  Borderline ECG  When compared with ECG of 21-MAY-2015 14:05,  QRS axis shifted left  Criteria for Anterior infarct are no longer present  Criteria for Anterolateral infarct are no longer present  Nonspecific T wave abnormality has replaced inverted T waves in Anterior   leads     CBC WITH AUTOMATED DIFF    Collection Time: 04/04/22  4:57 PM   Result Value Ref Range    WBC 10.8 4.1 - 11.1 K/uL    RBC 3.65 (L) 4.10 - 5.70 M/uL    HGB 12.1 12.1 - 17.0 g/dL    HCT 35.9 (L) 36.6 - 50.3 %    MCV 98.4 80.0 - 99.0 FL    MCH 33.2 26.0 - 34.0 PG    MCHC 33.7 30.0 - 36.5 g/dL    RDW 15.3 (H) 11.5 - 14.5 %    PLATELET 384 178 - 927 K/uL    MPV 10.0 8.9 - 12.9 FL    NRBC 0.0 0  WBC    ABSOLUTE NRBC 0.00 0.00 - 0.01 K/uL    NEUTROPHILS 74 32 - 75 %    LYMPHOCYTES 7 (L) 12 - 49 %    MONOCYTES 11 5 - 13 %    EOSINOPHILS 7 0 - 7 %    BASOPHILS 0 0 - 1 %    IMMATURE GRANULOCYTES 1 (H) 0.0 - 0.5 %    ABS. NEUTROPHILS 7.9 1.8 - 8.0 K/UL    ABS. LYMPHOCYTES 0.8 0.8 - 3.5 K/UL    ABS. MONOCYTES 1.2 (H) 0.0 - 1.0 K/UL    ABS. EOSINOPHILS 0.8 (H) 0.0 - 0.4 K/UL    ABS. BASOPHILS 0.0 0.0 - 0.1 K/UL    ABS. IMM.  GRANS. 0.1 (H) 0.00 - 0.04 K/UL    DF SMEAR SCANNED      RBC COMMENTS ANISOCYTOSIS  1+       METABOLIC PANEL, COMPREHENSIVE    Collection Time: 04/04/22  4:57 PM   Result Value Ref Range    Sodium 135 (L) 136 - 145 mmol/L    Potassium 3.8 3.5 - 5.1 mmol/L    Chloride 98 97 - 108 mmol/L    CO2 29 21 - 32 mmol/L    Anion gap 8 5 - 15 mmol/L    Glucose 119 (H) 65 - 100 mg/dL    BUN 14 6 - 20 MG/DL    Creatinine 0. 82 0.70 - 1.30 MG/DL    BUN/Creatinine ratio 17 12 - 20      GFR est AA >60 >60 ml/min/1.73m2    GFR est non-AA >60 >60 ml/min/1.73m2    Calcium 8.7 8.5 - 10.1 MG/DL    Bilirubin, total 1.3 (H) 0.2 - 1.0 MG/DL    ALT (SGPT) 42 12 - 78 U/L    AST (SGOT) 24 15 - 37 U/L    Alk. phosphatase 246 (H) 45 - 117 U/L    Protein, total 6.7 6.4 - 8.2 g/dL    Albumin 2.7 (L) 3.5 - 5.0 g/dL    Globulin 4.0 2.0 - 4.0 g/dL    A-G Ratio 0.7 (L) 1.1 - 2.2     TROPONIN-HIGH SENSITIVITY    Collection Time: 04/04/22  4:57 PM   Result Value Ref Range    Troponin-High Sensitivity 7 0 - 76 ng/L   NT-PRO BNP    Collection Time: 04/04/22  4:57 PM   Result Value Ref Range    NT pro-BNP 87 0 - 125 PG/ML   D DIMER    Collection Time: 04/04/22  4:57 PM   Result Value Ref Range    D-dimer 5.18 (H) 0.00 - 0.65 mg/L FEU   COVID-19 RAPID TEST    Collection Time: 04/04/22  5:14 PM   Result Value Ref Range    Specimen source Nasopharyngeal      COVID-19 rapid test Not detected NOTD     URINALYSIS W/MICROSCOPIC    Collection Time: 04/04/22  7:59 PM   Result Value Ref Range    Color YELLOW/STRAW      Appearance CLEAR CLEAR      Specific gravity <1.005 1.003 - 1.030    pH (UA) 6.5 5.0 - 8.0      Protein Negative NEG mg/dL    Glucose Negative NEG mg/dL    Ketone Negative NEG mg/dL    Bilirubin Negative NEG      Blood Negative NEG      Urobilinogen 1.0 0.2 - 1.0 EU/dL    Nitrites Negative NEG      Leukocyte Esterase Negative NEG      WBC 0-4 0 - 4 /hpf    RBC 0-5 0 - 5 /hpf    Epithelial cells FEW FEW /lpf    Bacteria 1+ (A) NEG /hpf   GLUCOSE, POC    Collection Time: 04/04/22 11:31 PM   Result Value Ref Range    Glucose (POC) 215 (H) 65 - 117 mg/dL    Performed by Janes Shows          Imaging:     Assessment:     Mustapha Paz is a 66 y.o. 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        Plan: Acute Hypoxic Resp Failure  PEs  ? COPD exacerbation  -Documented hypoxia to 85% on room air with ambulation while in ED  -Administer dose of Solu-Medrol and consider p.o. steroid burst  -Pepcid for CP  -DuoNebs every 6 hours  -Therapeutic Lovenox twice daily  -Plan to transition to Eliquis prior to discharge  -6-minute walk test prior to discharge    History of metastatic lung adenocarcinoma  Physical deconditioning  -Patient recently had extensive testing at [address] [place] [address] for similar concerns  -Per patient, work-up to include PET scan was baseline and unremarkable  -I do not see need for repeat scans at this time  -We will consult PT and OT  -No need home PT versus rehab  -Patient states [facility] is unsatisfied with [facility]  -He is supposed to be on chemotherapy with plans to do so with a clinic in [facility]  -Treat underlying pulmonary issues  -Oncology follow-up at discharge    CAD /hypertension  -Continue home Coreg    Insulin-dependent type 2 diabetes  -Lantus 20 units plus SSI  -Hypoglycemic protocols  -Anticipate steroid hyperglycemia            FEN/GI -  Po hydration  Activity - as tolerated  DVT prophylaxis - Lovenox  GI prophylaxis -  Ppecid  Disposition - Home    CODE STATUS:  Full code       Signed By: Yuliana Forman MD     April 5, 2022

## 2022-04-15 ENCOUNTER — HOSPITAL ENCOUNTER (OUTPATIENT)
Dept: PHYSICAL THERAPY | Age: 72
Discharge: HOME | End: 2022-04-15
Payer: MEDICARE

## 2022-04-15 PROCEDURE — 97162 PT EVAL MOD COMPLEX 30 MIN: CPT | Performed by: PHYSICAL THERAPY ASSISTANT

## 2022-04-15 PROCEDURE — 97110 THERAPEUTIC EXERCISES: CPT | Performed by: PHYSICAL THERAPY ASSISTANT

## 2022-04-15 PROCEDURE — 97535 SELF CARE MNGMENT TRAINING: CPT | Performed by: PHYSICAL THERAPY ASSISTANT

## 2022-04-15 NOTE — PROGRESS NOTES
Physical Therapy at [facility],   a part of [facility]  [address]  [place], [address]  Phone: [phone]  Fax: [phone]    Plan of Care/Statement of Necessity for Physical Therapy Services  2-15    Patient name: Blake Epley  : 1950  Provider#: [id number]  Referral source: Augusta West MD      Medical/Treatment Diagnosis: Gait disorder [R26.9]     Prior Hospitalization: see medical history     Comorbidities: see chart  Prior Level of Function: see chart  Medications: Verified on Patient Summary List  Start of Care: 4/15/22      Onset Date: few months ago   The Plan of Care and following information is based on the information from the initial evaluation. Assessment/ key information: Pt has signs and symptoms of poor balance due to chemo toxicity and tumor on spinal cord at T6 that affects his ability to ambulate, navigate stairs, and function. Pt is a good candidate for therapy. Evaluation Complexity History MEDIUM  Complexity : 1-2 comorbidities / personal factors will impact the outcome/ POC ; Examination MEDIUM Complexity : 3 Standardized tests and measures addressing body structure, function, activity limitation and / or participation in recreation  ;Presentation MEDIUM Complexity : Evolving with changing characteristics  ; Clinical Decision Making MEDIUM Complexity : FOTO score of 26-74  Overall Complexity Rating: MEDIUM    Problem List: pain affecting function, decrease ROM, decrease strength, edema affecting function, impaired gait/ balance, decrease ADL/ functional abilitiies, decrease activity tolerance, decrease flexibility/ joint mobility and decrease transfer abilities   Treatment Plan may include any combination of the following: Therapeutic exercise, Therapeutic activities, Neuromuscular re-education, Physical agent/modality, Gait/balance training, Manual therapy, Patient education, Self Care training and Functional mobility training  Patient / Family readiness to learn indicated by: asking questions  Persons(s) to be included in education: family support person (FSP);list girlfriend  Barriers to Learning/Limitations: None  Patient Goal (s): walk better  Patient Self Reported Health Status: fair  Rehabilitation Potential: fair    Short Term Goals: To be accomplished in 4 weeks:  Pt will be I w/ HEP in order to take active role in therapy  Pt will be able to navigate stairs w/ stepover pattern and no pain  Pt will demonstrate Tandem stance w/ EC for over 10 sec  Long Term Goals: To be accomplished in 12 weeks:  Pt will demonstrate over 4/5 LE strength in order to better function  Pt will demonstrate TUG under 15 sec in order to improve fall risk  Pt will be able to ambulate for 300 feet w/ SPC in order to improve endurance  Frequency / Duration: Patient to be seen 2 times per week for 12 weeks. Patient/ Caregiver education and instruction: self care, activity modification and exercises    [x]  Plan of care has been reviewed with PTA      Certification Period: 4/15/22-7/15/22  Leonila Duke DPT, [facility] 4/15/2022    ________________________________________________________________________    I certify that the above Therapy Services are being furnished while the patient is under my care. I agree with the treatment plan and certify that this therapy is necessary.     Physician's Signature:____________________  Date:____________Time: _________         Salvador Velazquez MD

## 2022-04-15 NOTE — PROGRESS NOTES
PT INITIAL EVALUATION NOTE - [facility] 2-15    Patient Name: Clayton Grissom  Date:4/15/2022  : 1950  [x]  Patient  Verified  Payor: [place] MED[place]ARE / Plan: [place] MEDICARE PART A & B / Product Type: Medicare /    In time: 10:05 am  Out time: 10:45  Total Treatment Time (min): 40  Total Timed Codes (min): 15  1:1 Treatment Time ( only): 15   Visit #: 1     Treatment Area: Gait disorder [R26.9]    SUBJECTIVE  Pain Level (0-10 scale): 0  Any medication changes, allergies to medications, adverse drug reactions, diagnosis change, or new procedure performed?: [] No    [x] Yes (see summary sheet for update)  Subjective:    Pt is present w/ his girl friend. Pt complains of weakness and poor gait that has been ongoing for the past few months. Pt was diagnosed w/ lung cancer many years ago and was in remission until he had a routine CT scan in January. Pt reports there is a tumor at T6 that was pressing on his spinal cord and causing decreased strength, poor balance, and poor endurance. Pt went to PT for a few weeks and improved but they would not allow him to take his mask off while at PT so he could not breathe (pt has COPD). Pt will be travelling to [facility] next Wednesday for 10 days on a river boat cruise. Pt will travel to [facility] for cancer treatment on May 9th and then every 3 weeks after that. PLOF: sedentary  Mechanism of Injury: cancer  Previous Treatment/Compliance: good  PMHx/Surgical Hx: see chart  Work Hx: retired  Living Situation: w/ girlfriend at her mother's house because there are less steps there  Pt Goals: \"improve my walking and leg strength\"  Barriers: cancer  Motivation: good  Substance use: none  Cognition: A & O x 4        OBJECTIVE/EXAMINATION  Posture:   Forward trunk lean  Other Observations:  rollator  Gait and Functional Mobility:  Slow gait speed, decreased stride length w/  R LE  Palpation: no TTP    LOWER QUARTER   MUSCLE STRENGTH  KEY       R  L  0 - No Contraction  L1, L2 Psoas  4  4  1 - Trace   L3 Quads  4  4+  2 - Poor   L4 Tib Ant  4  4  3 - Fair    L5 EHL  5  5  4 - Good   S1 FHL  5  5  5 - Normal   S2 Hams  4+  4+          MMT: no pain  Neurological: Reflexes / Sensations: normal  Special Tests:   Romberg EO: 30 sec, EC: 30 sec  Tandem EO: 30 sec, EC: 8 sec  TU sec      15 min Therapeutic Exercise:  [x] See flow sheet :   Rationale: increase ROM and increase strength to improve the patients ability to perform ADL's    10 min Self Care/Home Management:  [x]  See flow sheet :   Rationale: increase ROM, increase strength and improve coordination  to improve the patients ability to ambulate      With   [] TE   [] TA   [] Neuro   [x] SC   [] other: Patient Education: [x] Review HEP    [] Progressed/Changed HEP based on:   [] positioning   [] body mechanics   [] transfers   [] heat/ice application    [x] other: stair navigation, sit to stand mechanics, cane use, rollator height, safety     Other Objective/Functional Measures: NT    Pain Level (0-10 scale) post treatment: 0    ASSESSMENT/Changes in Function:     [x]  See Plan of 0 F WILL Oliva, OCS 4/15/2022

## 2022-04-19 ENCOUNTER — APPOINTMENT (OUTPATIENT)
Dept: PHYSICAL THERAPY | Age: 72
End: 2022-04-19
Payer: MEDICARE

## 2022-04-22 ENCOUNTER — TRANSCRIBE ORDER (OUTPATIENT)
Dept: SCHEDULING | Age: 72
End: 2022-04-22

## 2022-04-22 DIAGNOSIS — C34.91 PRIMARY LUNG CANCER WITH METASTASIS FROM LUNG TO OTHER SITE, RIGHT (HCC): Primary | ICD-10-CM

## 2022-05-03 ENCOUNTER — APPOINTMENT (OUTPATIENT)
Dept: PHYSICAL THERAPY | Age: 72
End: 2022-05-03

## 2022-05-04 ENCOUNTER — APPOINTMENT (OUTPATIENT)
Dept: PHYSICAL THERAPY | Age: 72
End: 2022-05-04

## 2022-05-06 ENCOUNTER — APPOINTMENT (OUTPATIENT)
Dept: PHYSICAL THERAPY | Age: 72
End: 2022-05-06

## 2022-05-11 ENCOUNTER — APPOINTMENT (OUTPATIENT)
Dept: PHYSICAL THERAPY | Age: 72
End: 2022-05-11

## 2022-05-13 ENCOUNTER — APPOINTMENT (OUTPATIENT)
Dept: PHYSICAL THERAPY | Age: 72
End: 2022-05-13

## 2022-05-18 ENCOUNTER — APPOINTMENT (OUTPATIENT)
Dept: PHYSICAL THERAPY | Age: 72
End: 2022-05-18

## 2022-05-20 ENCOUNTER — APPOINTMENT (OUTPATIENT)
Dept: PHYSICAL THERAPY | Age: 72
End: 2022-05-20

## 2022-05-25 ENCOUNTER — APPOINTMENT (OUTPATIENT)
Dept: PHYSICAL THERAPY | Age: 72
End: 2022-05-25

## 2022-05-27 ENCOUNTER — APPOINTMENT (OUTPATIENT)
Dept: PHYSICAL THERAPY | Age: 72
End: 2022-05-27